# Patient Record
Sex: MALE | Race: BLACK OR AFRICAN AMERICAN | Employment: UNEMPLOYED | URBAN - METROPOLITAN AREA
[De-identification: names, ages, dates, MRNs, and addresses within clinical notes are randomized per-mention and may not be internally consistent; named-entity substitution may affect disease eponyms.]

---

## 2017-02-08 ENCOUNTER — HOSPITAL ENCOUNTER (OUTPATIENT)
Dept: LAB | Age: 72
Discharge: HOME | End: 2017-02-08
Payer: MEDICARE

## 2017-02-08 ENCOUNTER — OFFICE VISIT (OUTPATIENT)
Dept: INTERNAL MEDICINE CLINIC | Age: 72
End: 2017-02-08

## 2017-02-08 VITALS
DIASTOLIC BLOOD PRESSURE: 89 MMHG | HEART RATE: 82 BPM | OXYGEN SATURATION: 90 % | RESPIRATION RATE: 18 BRPM | HEIGHT: 68 IN | WEIGHT: 193 LBS | TEMPERATURE: 96.9 F | SYSTOLIC BLOOD PRESSURE: 125 MMHG | BODY MASS INDEX: 29.25 KG/M2

## 2017-02-08 DIAGNOSIS — G56.03 BILATERAL CARPAL TUNNEL SYNDROME: ICD-10-CM

## 2017-02-08 DIAGNOSIS — M21.969 FOOT DEFORMITY, UNSPECIFIED LATERALITY: ICD-10-CM

## 2017-02-08 DIAGNOSIS — M21.619 BUNION: ICD-10-CM

## 2017-02-08 DIAGNOSIS — E78.5 HYPERLIPIDEMIA, UNSPECIFIED HYPERLIPIDEMIA TYPE: Chronic | ICD-10-CM

## 2017-02-08 DIAGNOSIS — Z11.59 NEED FOR HEPATITIS C SCREENING TEST: ICD-10-CM

## 2017-02-08 DIAGNOSIS — E11.59 TYPE 2 DIABETES MELLITUS WITH OTHER CIRCULATORY COMPLICATIONS (HCC): ICD-10-CM

## 2017-02-08 DIAGNOSIS — Z99.81 OXYGEN DEPENDENT: Chronic | ICD-10-CM

## 2017-02-08 DIAGNOSIS — E11.9 CONTROLLED TYPE 2 DIABETES MELLITUS WITHOUT COMPLICATION, WITHOUT LONG-TERM CURRENT USE OF INSULIN (HCC): Chronic | ICD-10-CM

## 2017-02-08 DIAGNOSIS — I83.90 VARICOSE VEIN OF LEG: ICD-10-CM

## 2017-02-08 DIAGNOSIS — E11.9 CONTROLLED TYPE 2 DIABETES MELLITUS WITHOUT COMPLICATION, WITHOUT LONG-TERM CURRENT USE OF INSULIN (HCC): Primary | Chronic | ICD-10-CM

## 2017-02-08 DIAGNOSIS — J43.9 PULMONARY EMPHYSEMA, UNSPECIFIED EMPHYSEMA TYPE (HCC): Chronic | ICD-10-CM

## 2017-02-08 DIAGNOSIS — I87.2 VENOUS STASIS DERMATITIS OF BOTH LOWER EXTREMITIES: ICD-10-CM

## 2017-02-08 DIAGNOSIS — Z23 ENCOUNTER FOR IMMUNIZATION: ICD-10-CM

## 2017-02-08 LAB
ALBUMIN SERPL BCP-MCNC: 3.8 G/DL (ref 3.4–5)
ALBUMIN/GLOB SERPL: 1.1 {RATIO} (ref 0.8–1.7)
ALP SERPL-CCNC: 95 U/L (ref 45–117)
ALT SERPL-CCNC: 23 U/L (ref 16–61)
ANION GAP BLD CALC-SCNC: 8 MMOL/L (ref 3–18)
APPEARANCE UR: CLEAR
AST SERPL W P-5'-P-CCNC: 17 U/L (ref 15–37)
BILIRUB SERPL-MCNC: 0.3 MG/DL (ref 0.2–1)
BILIRUB UR QL: NEGATIVE
BUN SERPL-MCNC: 15 MG/DL (ref 7–18)
BUN/CREAT SERPL: 19 (ref 12–20)
CALCIUM SERPL-MCNC: 9.4 MG/DL (ref 8.5–10.1)
CHLORIDE SERPL-SCNC: 101 MMOL/L (ref 100–108)
CHOLEST SERPL-MCNC: 175 MG/DL
CO2 SERPL-SCNC: 34 MMOL/L (ref 21–32)
COLOR UR: YELLOW
CREAT SERPL-MCNC: 0.81 MG/DL (ref 0.6–1.3)
GLOBULIN SER CALC-MCNC: 3.4 G/DL (ref 2–4)
GLUCOSE SERPL-MCNC: 104 MG/DL (ref 74–99)
GLUCOSE UR STRIP.AUTO-MCNC: NEGATIVE MG/DL
HBA1C MFR BLD: 6.9 % (ref 4.2–5.6)
HDLC SERPL-MCNC: 60 MG/DL (ref 40–60)
HDLC SERPL: 2.9 {RATIO} (ref 0–5)
HGB UR QL STRIP: NEGATIVE
KETONES UR QL STRIP.AUTO: NEGATIVE MG/DL
LDLC SERPL CALC-MCNC: 79.8 MG/DL (ref 0–100)
LEUKOCYTE ESTERASE UR QL STRIP.AUTO: NEGATIVE
LIPID PROFILE,FLP: ABNORMAL
NITRITE UR QL STRIP.AUTO: NEGATIVE
PH UR STRIP: >8.5 [PH] (ref 5–8)
POTASSIUM SERPL-SCNC: 4.3 MMOL/L (ref 3.5–5.5)
PROT SERPL-MCNC: 7.2 G/DL (ref 6.4–8.2)
PROT UR STRIP-MCNC: NEGATIVE MG/DL
SODIUM SERPL-SCNC: 143 MMOL/L (ref 136–145)
SP GR UR REFRACTOMETRY: 1.02 (ref 1–1.03)
TRIGL SERPL-MCNC: 176 MG/DL (ref ?–150)
UROBILINOGEN UR QL STRIP.AUTO: 1 EU/DL (ref 0.2–1)
VLDLC SERPL CALC-MCNC: 35.2 MG/DL

## 2017-02-08 PROCEDURE — 80061 LIPID PANEL: CPT | Performed by: INTERNAL MEDICINE

## 2017-02-08 PROCEDURE — 83036 HEMOGLOBIN GLYCOSYLATED A1C: CPT | Performed by: INTERNAL MEDICINE

## 2017-02-08 PROCEDURE — 86803 HEPATITIS C AB TEST: CPT | Performed by: INTERNAL MEDICINE

## 2017-02-08 PROCEDURE — 81003 URINALYSIS AUTO W/O SCOPE: CPT | Performed by: INTERNAL MEDICINE

## 2017-02-08 PROCEDURE — 82043 UR ALBUMIN QUANTITATIVE: CPT | Performed by: INTERNAL MEDICINE

## 2017-02-08 PROCEDURE — 80053 COMPREHEN METABOLIC PANEL: CPT | Performed by: INTERNAL MEDICINE

## 2017-02-08 NOTE — PROGRESS NOTES
HISTORY OF PRESENT ILLNESS  Kike Cespedes is a 73 y.o. male. Visit Vitals    /89    Pulse 82    Temp 96.9 °F (36.1 °C) (Oral)    Resp 18    Ht 5' 8\" (1.727 m)    Wt 193 lb (87.5 kg)    SpO2 90%  Comment: on 1L of O2    BMI 29.35 kg/m2       New Patient   The history is provided by the patient. This is a new problem. Numbness   The history is provided by the patient. This is a new problem. The current episode started more than 1 week ago. The problem has not changed since onset. Review of Systems   Neurological: Positive for numbness.        Physical Exam    ASSESSMENT and PLAN  {ASSESSMENT/PLAN:35611}

## 2017-02-08 NOTE — MR AVS SNAPSHOT
Visit Information Date & Time Provider Department Dept. Phone Encounter #  
 2/8/2017  3:15 PM Yaw [facility] ([place]) [phone] [id number] Follow-up Instructions Return in about 4 weeks (around 3/8/2017) for HTN, DM, cholesterol, recheck skin/legs. Upcoming Health Maintenance Date Due Hepatitis C Screening 1945 DTaP/Tdap/Td series (1 - Tdap) 6/11/1968 Pneumococcal 65+ Low/Medium Risk (1 of 2 - PCV13) 6/11/2012 FOOT EXAM Q1 4/24/2016 MICROALBUMIN Q1 5/29/2016 INFLUENZA AGE 9 TO ADULT 8/1/2016 HEMOGLOBIN A1C Q6M 9/8/2016 LIPID PANEL Q1 11/18/2016 MEDICARE YEARLY EXAM 2/18/2017 EYE EXAM RETINAL OR DILATED Q1 5/2/2017 GLAUCOMA SCREENING Q2Y 5/2/2018 COLONOSCOPY 6/26/2025 Allergies as of 2/8/2017  Review Complete On: 2/8/2017 By: Yaw Schwarz MD  
  
 Severity Noted Reaction Type Reactions Ace Inhibitors  10/09/2015    Cough Current Immunizations  Reviewed on 3/16/2016 Name Date Influenza High Dose Vaccine PF  Incomplete Influenza Vaccine 10/31/2014 Not reviewed this visit You Were Diagnosed With   
  
 Codes Comments Controlled type 2 diabetes mellitus without complication, without long-term current use of insulin ([facility]    -  Primary ICD-10-CM: E11.9 ICD-9-CM: 250.00 Pulmonary emphysema, unspecified emphysema type ([facility]     ICD-10-CM: J43.9 ICD-9-CM: 492.8 Hyperlipidemia, unspecified hyperlipidemia type     ICD-10-CM: E78.5 ICD-9-CM: 272.4 Oxygen dependent     ICD-10-CM: Z99.81 ICD-9-CM: V46.2 Bilateral carpal tunnel syndrome     ICD-10-CM: G56.03 
ICD-9-CM: 354.0 Venous stasis dermatitis of both lower extremities     ICD-10-CM: I83.11, I83.12 
ICD-9-CM: 454.1 Varicose vein of leg     ICD-10-CM: I83.90 ICD-9-CM: 454.9 Type 2 diabetes mellitus with other circulatory complications (HCC)     ZKE-96-HO: E11.59 
ICD-9-CM: 250.70 Need for hepatitis C screening test     ICD-10-CM: Z11.59 
ICD-9-CM: V73.89 Foot deformity, unspecified laterality     ICD-10-CM: M21.969 ICD-9-CM: 736.70 Bunion     ICD-10-CM: J35.861 ICD-9-CM: 727.1 Encounter for immunization     ICD-10-CM: Q60 ICD-9-CM: V03.89 Vitals BP Pulse Temp Resp Height(growth percentile) Weight(growth percentile) 125/89 82 96.9 °F (36.1 °C) (Oral) 18 5' 8\" (1.727 m) 193 lb (87.5 kg) SpO2 BMI Smoking Status 90% 29.35 kg/m2 Former Smoker BMI and BSA Data Body Mass Index Body Surface Area  
 29.35 kg/m 2 2.05 m 2 Preferred Pharmacy Pharmacy Name Phone [address], [address] [phone] Your Updated Medication List  
  
   
This list is accurate as of: 2/8/17  4:34 PM.  Always use your most recent med list.  
  
  
  
  
 aspirin, buffered 81 mg Tab Take  by mouth. azithromycin 250 mg tablet Commonly known as:  Prema Stanley Take two tablets today then one tablet daily Blood-Glucose Meter monitoring kit Use to check blood sugar daily  
  
 bumetanide 1 mg tablet Commonly known as:  Myra Baar Take one tablet weekly  
  
 clotrimazole-betamethasone topical cream  
Commonly known as:  Milas Gabriele Apply to affected area BID for 2 weeks. fluticasone-salmeterol 500-50 mcg/dose diskus inhaler Commonly known as:  ADVAIR DISKUS  
TAKE 1 PUFF BY INHALATION EVERY TWELVE (12) HOURS. losartan 50 mg tablet Commonly known as:  COZAAR Take 1 Tab by mouth daily. metFORMIN 1,000 mg tablet Commonly known as:  GLUCOPHAGE Take 1 Tab by mouth two (2) times daily (with meals). metoprolol succinate 25 mg XL tablet Commonly known as:  TOPROL-XL Take 1 tablet by mouth daily. pantoprazole 40 mg tablet Commonly known as:  PROTONIX  
TAKE 1 TABLET BY MOUTH DAILY. potassium chloride SR 10 mEq tablet Commonly known as:  KLOR-CON 10  
 Take 20 mEq by mouth two (2) times a day. simvastatin 20 mg tablet Commonly known as:  ZOCOR Take 1 Tab by mouth nightly. SPIRIVA WITH HANDIHALER 18 mcg inhalation capsule Generic drug:  tiotropium USE 1 CAPSULE VIA HANDIHALER ONCE A DAY FOR INHALATION ***DO NOT SWALLOW THE CAPSULES***  
  
 tamsulosin 0.4 mg capsule Commonly known as:  FLOMAX Take 1 capsule by mouth daily. traMADol 50 mg tablet Commonly known as:  ULTRAM  
Take 1 Tab by mouth every eight (8) hours as needed for Pain. * VENTOLIN HFA 90 mcg/actuation inhaler Generic drug:  albuterol TAKE 2 PUFFS BY INHALATION EVERY FOUR (4) HOURS AS NEEDED FOR WHEEZING  . * albuterol 2.5 mg /3 mL (0.083 %) nebulizer solution Commonly known as:  PROVENTIL VENTOLIN  
3 mL by Nebulization route every four (4) hours as needed for Wheezing. Dispense 100 unit dose vials * albuterol 90 mcg/actuation inhaler Commonly known as:  PROVENTIL HFA, VENTOLIN HFA, PROAIR HFA Take 2 Puffs by inhalation every six (6) hours as needed for Wheezing. * Notice: This list has 3 medication(s) that are the same as other medications prescribed for you. Read the directions carefully, and ask your doctor or other care provider to review them with you. We Performed the Following ADMIN INFLUENZA VIRUS VAC [ [facility]] AMB SUPPLY ORDER [[id number] Custom] Comments:  
 Bilateral wrist splints AMB SUPPLY ORDER [[id number] Custom] Comments:  
 Diabetic shoes INFLUENZA VIRUS VACCINE, HIGH DOSE SEASONAL, PRESERVATIVE FREE [17521 CPT(R)] REFERRAL TO PODIATRY [REF90 Custom] Comments:  
 Please evaluate patient for diabetic foot care. Follow-up Instructions Return in about 4 weeks (around 3/8/2017) for HTN, DM, cholesterol, recheck skin/legs. To-Do List   
 Around 02/08/2017 Imaging:  ANKLE BRACHIAL INDEX   
  
 02/08/2017 Lab:  HCV AB W/RFLX TO JEREMY   
  
 02/08/2017 Lab:  HEMOGLOBIN A1C W/O EAG   
  
 02/08/2017 Lab:  LIPID PANEL   
  
 02/08/2017 Lab:  METABOLIC PANEL, COMPREHENSIVE   
  
 02/08/2017 Lab:  MICROALBUMIN, UR, RAND W/ MICROALBUMIN/CREA RATIO   
  
 02/08/2017 Lab:  URINALYSIS W/ RFLX MICROSCOPIC Referral Information Referral ID Referred By Referred To  
  
 [id number] Misa Orona [facility] Not Available Visits Status Start Date End Date 1 New Request 2/8/17 2/8/18 If your referral has a status of pending review or denied, additional information will be sent to support the outcome of this decision. Introducing [facility]! Keya Schaeffer introduces [employer] patient portal. Now you can access parts of your medical record, email your doctor's office, and request medication refills online. 1. In your internet browser, go to [email or web address] 2. Click on the First Time User? Click Here link in the Sign In box. You will see the New Member Sign Up page. 3. Enter your [employer] Access Code exactly as it appears below. You will not need to use this code after youve completed the sign-up process. If you do not sign up before the expiration date, you must request a new code. · [employer] Access Code: [email or web address] Expires: 5/9/2017  4:34 PM 
 
4. Enter the last four digits of your Social Security Number (xxxx) and Date of Birth (mm/dd/yyyy) as indicated and click Submit. You will be taken to the next sign-up page. 5. Create a Int[employer] ID. This will be your [employer] login ID and cannot be changed, so think of one that is secure and easy to remember. 6. Create a [employer] password. You can change your password at any time. 7. Enter your Password Reset Question and Answer. This can be used at a later time if you forget your password. 8. Enter your e-mail address. You will receive e-mail notification when new information is available in [address] 9. Click Sign Up. You can now view and download portions of your medical record. 10. Click the Download Summary menu link to download a portable copy of your medical information. If you have questions, please visit the Frequently Asked Questions section of the [employer] website. Remember, [employer] is NOT to be used for urgent needs. For medical emergencies, dial 911. Now available from your iPhone and Android! Please provide this summary of care documentation to your next provider. Your primary care clinician is listed as CHRISTIAN Singh. If you have any questions after today's visit, please call [phone].

## 2017-02-08 NOTE — PROGRESS NOTES
HISTORY OF PRESENT ILLNESS  Hawa Thayer is a 73 y.o. male. Visit Vitals    /89    Pulse 82    Temp 96.9 °F (36.1 °C) (Oral)    Resp 18    Ht 5' 8\" (1.727 m)    Wt 193 lb (87.5 kg)    SpO2 90%  Comment: on 1L of O2    BMI 29.35 kg/m2       HPI Comments: Pt here with multiple issues. Not happy with current MD.   Has multiple issues inc COPD, oxygen dependent, diabetes mellitus, foot deformities, hand numbness    New Patient   The history is provided by the patient (see comments). This is a new problem. Numbness   The history is provided by the patient (both hands, thumb to 4th digits). This is a new problem. The current episode started more than 1 week ago. The problem has not changed since onset. Primary symptoms include focal weakness. Diabetes   The history is provided by the patient. This is a chronic problem. The current episode started more than 1 week ago. The problem occurs daily. The problem has not changed since onset. Exacerbated by: diet. The symptoms are relieved by medications (diet). Treatments tried: see med list. The treatment provided moderate relief. Review of Systems   Neurological: Positive for focal weakness and numbness. Physical Exam   Constitutional: He is oriented to person, place, and time. He appears well-developed and well-nourished. No distress. HENT:   Right Ear: Tympanic membrane, external ear and ear canal normal.   Left Ear: Tympanic membrane, external ear and ear canal normal.   Mouth/Throat: Uvula is midline, oropharynx is clear and moist and mucous membranes are normal.   edentulous   Eyes: Conjunctivae and EOM are normal.   Cardiovascular: Normal rate and regular rhythm. Pulmonary/Chest: Effort normal and breath sounds normal.   Musculoskeletal: He exhibits no edema. Neurological: He is alert and oriented to person, place, and time.    Diabetic foot exam:     Left: Reflexes 2+     Vibratory sensation diminished    Proprioception normal   Sharp/dull discrimination     Filament test normal sensation with micro filament   Pulse DP: 2+ (normal)   Pulse PT:         + tinel's at left wrist. ? Suggestion of thenar wasting. Good  bilat. Deformities: Yes - severe bunion deformity with overlapping toes    Right: Reflexes 2+   Vibratory sensation diminished   Proprioception normal   Sharp/dull discrimination    Filament test normal sensation with micro filament   Pulse DP: 2+ (normal)   Pulse PT:    Deformities: Yes - severe bunion deformity with overlapping toes     Skin: Skin is warm and dry. He is not diaphoretic. Has dry cracked patch on left posterior ankle with some stasis derm changes   Psychiatric: He has a normal mood and affect. Nursing note and vitals reviewed. ASSESSMENT and PLAN    ICD-10-CM ICD-9-CM    1. Controlled type 2 diabetes mellitus without complication, without long-term current use of insulin (HCC) X43.9 008.44 METABOLIC PANEL, COMPREHENSIVE      LIPID PANEL      HEMOGLOBIN A1C W/O EAG      MICROALBUMIN, UR, RAND W/ MICROALBUMIN/CREA RATIO      URINALYSIS W/ RFLX MICROSCOPIC      AMB SUPPLY ORDER      REFERRAL TO PODIATRY       DIABETES FOOT EXAM   2. Pulmonary emphysema, unspecified emphysema type ([facility] J43.9 492.8    3. Hyperlipidemia, unspecified hyperlipidemia type E78.5 272.4 LIPID PANEL   4. Oxygen dependent Z99.81 V46.2    5. Bilateral carpal tunnel syndrome G56.03 354.0 AMB SUPPLY ORDER   6. Venous stasis dermatitis of both lower extremities I83.11 454. 1 ANKLE BRACHIAL INDEX    I83.12     7. Varicose vein of leg I83.90 454.9 ANKLE BRACHIAL INDEX   8. Type 2 diabetes mellitus with other circulatory complications (HCC)  H72.30 250.70 ANKLE BRACHIAL INDEX      AMB SUPPLY ORDER      REFERRAL TO PODIATRY   9. Need for hepatitis C screening test Z11.59 V73.89 HCV AB W/RFLX TO JEREMY   10.  Foot deformity, unspecified laterality M21.969 736.70 AMB SUPPLY ORDER      REFERRAL TO PODIATRY   11. Bunion M21.619 727.1 AMB SUPPLY ORDER      REFERRAL TO PODIATRY   12. Encounter for immunization Z23 V03.89 INFLUENZA VIRUS VACCINE, HIGH DOSE SEASONAL, PRESERVATIVE FREE      ADMIN INFLUENZA VIRUS VAC     Available hospital record reviewed  Past medical history, surgical history, family history, and social history reviewed with patient  Very complex patient    Update lab    Other orders as noted  Wrist splints--discussed long term tx and evaluation for bilat CTS. Diabetic shoes requested given severe foot deformities. Refer to podiatry for diabetic foot care    Check LIMA given the skin changes on his ankles.  Recommended vaseline for those areas    F/u one month

## 2017-02-08 NOTE — PROGRESS NOTES
ROOM # 4    Pt presents today for New patient. Complains of numbness in his left hand, and a rash on his left ankle    Pt preferred language for health care discussion is english. Is someone accompanying this pt? no    Is the patient using any DME equipment during [address]? wheeled seated walker    Depression Screening completed. Yes    Learning Assessment completed. Yes    Abuse Screening completed. Yes    Health Maintenance reviewed and discussed per provider. Yes, first visit, all HM discussed with Provider    Advance Directive:  1. Do you have an advance directive in place? Patient Reply: No    2. If not, would you like material regarding how to put one in place? Patient Reply: No    Coordination of Care:  1. Have you been to the ER, urgent care clinic since your last visit? Hospitalized since your last visit? No    2. Have you seen or consulted any other health care providers outside of the [facility] since your last visit? Include any pap smears or colon screening.  No

## 2017-02-09 LAB
CREAT UR-MCNC: 109.04 MG/DL (ref 30–125)
HCV AB S/CO SERPL IA: <0.1 S/CO RATIO (ref 0–0.9)
HCV AB SERPL QL IA: NORMAL
MICROALBUMIN UR-MCNC: 3.1 MG/DL (ref 0–3)
MICROALBUMIN/CREAT UR-RTO: 28 MG/G (ref 0–30)

## 2017-03-13 ENCOUNTER — TELEPHONE (OUTPATIENT)
Dept: INTERNAL MEDICINE CLINIC | Age: 72
End: 2017-03-13

## 2017-03-13 NOTE — TELEPHONE ENCOUNTER
Venice Senior called to state [place] [facility] [place] but 2 changes necessary. Date of Service should be 2/13/17. Refer to facility [phone].

## 2017-03-14 ENCOUNTER — HOSPITAL ENCOUNTER (OUTPATIENT)
Dept: VASCULAR SURGERY | Age: 72
Discharge: HOME | End: 2017-03-14
Attending: INTERNAL MEDICINE
Payer: MEDICARE

## 2017-03-14 DIAGNOSIS — I83.90 VARICOSE VEIN OF LEG: ICD-10-CM

## 2017-03-14 DIAGNOSIS — E11.59 TYPE 2 DIABETES MELLITUS WITH OTHER CIRCULATORY COMPLICATIONS (HCC): ICD-10-CM

## 2017-03-14 DIAGNOSIS — I87.2 VENOUS STASIS DERMATITIS OF BOTH LOWER EXTREMITIES: ICD-10-CM

## 2017-03-14 PROCEDURE — 93922 UPR/L XTREMITY ART 2 LEVELS: CPT

## 2017-03-14 NOTE — PROCEDURES
[facility]  *** FINAL REPORT ***    Name: Mukul Ayers  MRN: [id number]    Outpatient  : 1945  HIS Order #: [id number]  [id number] [facility] Visit #: [id number]  Date: 14 Mar 2017    TYPE OF TEST: Peripheral Arterial Testing    REASON FOR TEST  Stasis dermatitis    Right Leg  Doppler:    Normal  Ankle/Brachial: 1.05    Left Leg  Doppler:    Normal  Ankle/Brachial: 1.02    INTERPRETATION/FINDINGS  Physiologic testing was performed using continuous wave Doppler and  segmental pressures. 1. No evidence of significant peripheral arterial disease at rest in  the right leg. 2. No evidence of significant peripheral arterial disease at rest in  the left leg. 3. The right ankle/brachial index is 1.05 and the left ankle/brachial  index is 1.02.  4. Normal digital brachial index of 0.60 or greater bilaterally with  no signficiant digital disease identified. ADDITIONAL COMMENTS    I have personally reviewed the data relevant to the interpretation of  this  study. TECHNOLOGIST: Valdo Lyons. Carey Kent  Signed: 2017 12:53 PM    PHYSICIAN: Derek Pop.  John Garcia MD  Signed: 2017 09:54 PM

## 2017-03-21 DIAGNOSIS — E11.9 CONTROLLED TYPE 2 DIABETES MELLITUS WITHOUT COMPLICATION, WITHOUT LONG-TERM CURRENT USE OF INSULIN (HCC): Chronic | ICD-10-CM

## 2017-03-21 RX ORDER — ISOPROPYL ALCOHOL 70 ML/100ML
SWAB TOPICAL
Qty: 100 PAD | Refills: 5 | Status: SHIPPED | OUTPATIENT
Start: 2017-03-21 | End: 2018-05-03 | Stop reason: SDUPTHER

## 2017-03-21 RX ORDER — LANCETS
EACH MISCELLANEOUS
Qty: 100 EACH | Refills: 5 | Status: SHIPPED | OUTPATIENT
Start: 2017-03-21 | End: 2018-05-05 | Stop reason: SDUPTHER

## 2017-03-21 RX ORDER — BLOOD-GLUCOSE METER
EACH MISCELLANEOUS
Qty: 1 EACH | Status: SHIPPED | OUTPATIENT
Start: 2017-03-21 | End: 2018-05-05 | Stop reason: SDUPTHER

## 2017-03-21 NOTE — TELEPHONE ENCOUNTER
Requested Prescriptions     Pending Prescriptions Disp Refills    alcohol swabs (BD SINGLE USE SWABS REGULAR) padm 100 Pad 5     Sig: Use as directed to test blood sugar    Blood Glucose Control High&Low (ACCU-CHEK BRIAN CONTROL SOLN) soln 1 Bottle 5     Sig: use to test meter with each new box of strips and as needed    Blood-Glucose Meter (ACCU-CHEK BRIAN PLUS METER) misc 1 Each prn     Sig: Use to test blood sugar daily or as directed    Lancets (ACCU-CHEK SOFTCLIX LANCETS) misc 100 Each 5     Sig: Use to test blood sugar daily    glucose blood VI test strips (ACCU-CHEK BRIAN PLUS TEST STRP) strip 100 Strip 5     Sig: Use to test blood sugar daily

## 2017-03-21 NOTE — TELEPHONE ENCOUNTER
Requested Prescriptions     Pending Prescriptions Disp Refills    alcohol swabs (BD SINGLE USE SWABS REGULAR) padm 100 Pad 5     Sig: Use as directed to test blood sugar    Blood Glucose Control High&Low (ACCU-CHEK BRIAN CONTROL SOLN) soln 1 Bottle 5     Sig: use to test meter with each new box of strips and as needed

## 2017-03-22 NOTE — TELEPHONE ENCOUNTER
Venice Senior called to state received [facility] referral but 2 changes necessary.     Date of Service should be 2/13/17.     Refer to facility [phone].         Updated info on [place] [facility] Payal Weir ortho fax confirmation received as ok

## 2017-03-23 ENCOUNTER — TELEPHONE (OUTPATIENT)
Dept: INTERNAL MEDICINE CLINIC | Age: 72
End: 2017-03-23

## 2017-03-23 NOTE — TELEPHONE ENCOUNTER
Spoke with patient, confirmed name and . Patient just wanted to make our office aware that he had his LIMA performed and was told that he may need an antibiotic, but other than that he looked good. Stated that he has a cold, and will probably come in Saturday to be checked out and have it taken care of.     Be advised

## 2017-03-23 NOTE — TELEPHONE ENCOUNTER
Patient would like to speak to Dr. Chris Arreola nurse in reference to his recent hospital stay.   Can be reached at [phone]

## 2017-03-27 NOTE — TELEPHONE ENCOUNTER
Jeanine Carter from [facility] said she have been trying to get a [employer] referral completed for over a month now and nothing have been done.  She would like Shelley Bonds to call her at [phone]

## 2017-03-28 ENCOUNTER — TELEPHONE (OUTPATIENT)
Dept: INTERNAL MEDICINE CLINIC | Age: 72
End: 2017-03-28

## 2017-03-28 NOTE — TELEPHONE ENCOUNTER
Spoke with [employer], confirmed pt name and . Discussed DME referral request back date.  Approval # [id number]    Encounter to be closed

## 2017-03-28 NOTE — TELEPHONE ENCOUNTER
Spoke with Edgar Fiore confirmed pt name and . After speaking with LEVI Hernández, I advised that if she would be so kind to fax the updated referral back to [facility], that all issues should be taken care of. Fam verbalized understanding.

## 2017-05-03 ENCOUNTER — OFFICE VISIT (OUTPATIENT)
Dept: INTERNAL MEDICINE CLINIC | Age: 72
End: 2017-05-03

## 2017-05-03 VITALS
BODY MASS INDEX: 29.25 KG/M2 | WEIGHT: 193 LBS | TEMPERATURE: 97.6 F | HEART RATE: 105 BPM | SYSTOLIC BLOOD PRESSURE: 129 MMHG | OXYGEN SATURATION: 92 % | RESPIRATION RATE: 20 BRPM | DIASTOLIC BLOOD PRESSURE: 69 MMHG | HEIGHT: 68 IN

## 2017-05-03 DIAGNOSIS — Z23 ENCOUNTER FOR IMMUNIZATION: ICD-10-CM

## 2017-05-03 DIAGNOSIS — E11.9 CONTROLLED TYPE 2 DIABETES MELLITUS WITHOUT COMPLICATION, WITHOUT LONG-TERM CURRENT USE OF INSULIN (HCC): Chronic | ICD-10-CM

## 2017-05-03 DIAGNOSIS — L30.9 DERMATITIS: ICD-10-CM

## 2017-05-03 DIAGNOSIS — Z00.00 ROUTINE GENERAL MEDICAL EXAMINATION AT A HEALTH CARE FACILITY: Primary | ICD-10-CM

## 2017-05-03 DIAGNOSIS — E78.5 HYPERLIPIDEMIA, UNSPECIFIED HYPERLIPIDEMIA TYPE: Chronic | ICD-10-CM

## 2017-05-03 DIAGNOSIS — Z13.39 SCREENING FOR ALCOHOLISM: ICD-10-CM

## 2017-05-03 DIAGNOSIS — Z99.81 OXYGEN DEPENDENT: Chronic | ICD-10-CM

## 2017-05-03 DIAGNOSIS — R53.83 FATIGUE, UNSPECIFIED TYPE: ICD-10-CM

## 2017-05-03 RX ORDER — CLOTRIMAZOLE AND BETAMETHASONE DIPROPIONATE 10; .64 MG/G; MG/G
CREAM TOPICAL
Qty: 30 G | Refills: 11 | Status: SHIPPED | OUTPATIENT
Start: 2017-05-03 | End: 2017-05-04 | Stop reason: SDUPTHER

## 2017-05-03 NOTE — PATIENT INSTRUCTIONS
Schedule of Personalized Health Plan  (Provide Copy to Patient)  The best way to stay healthy is to live a healthy lifestyle. A healthy lifestyle includes regular exercise, eating a well-balanced diet, keeping a healthy weight and not smoking. Regular physical exams and screening tests are another important way to take care of yourself. Preventive exams provided by health care providers can find health problems early when treatment works best and can keep you from getting certain diseases or illnesses. Preventive services include exams, lab tests, screenings, shots, monitoring and information to help you take care of your own health. All people over 65 should have a pneumonia shot. Pneumonia shots are usually only needed once in a lifetime unless your doctor decides differently. All people over 65 should have a yearly flu shot. People over 65 are at medium to high risk for Hepatitis B. Three shots are needed for complete protection. In addition to your physical exam, some screening tests are recommended:    Bone mass measurement (dexa scan) is recommended every two years if you have certain risk factors, such as personal history of vertebral fracture or chronic steroid medication use    Diabetes Mellitus screening is recommended every year. Glaucoma is an eye disease caused by high pressure in the eye. An eye exam is recommended every year. Cardiovascular screening tests that check your cholesterol and other blood fat (lipid) levels are recommended every five years. Colorectal Cancer screening tests help to find pre-cancerous polyps (growths in the colon) so they can be removed before they turn into cancer. Tests ordered for screening depend on your personal and family history risk factors.     Screening for Prostate Cancer is recommended yearly with a digital rectal exam and/or a PSA test    Here is a list of your current Health Maintenance items with a due date:  Health Maintenance Topic Date Due    Pneumococcal 65+ Low/Medium Risk (1 of 2 - PCV13) 06/11/2012    MEDICARE YEARLY EXAM  02/18/2017    EYE EXAM RETINAL OR DILATED Q1  05/02/2017    DTaP/Tdap/Td series (1 - Tdap) 04/18/2018 (Originally 6/11/1966)    INFLUENZA AGE 9 TO ADULT  08/01/2017    HEMOGLOBIN A1C Q6M  08/08/2017    FOOT EXAM Q1  02/08/2018    MICROALBUMIN Q1  02/08/2018    LIPID PANEL Q1  02/08/2018    GLAUCOMA SCREENING Q2Y  05/02/2018    COLONOSCOPY  06/26/2025    Hepatitis C Screening  Completed    ZOSTER VACCINE AGE 60>  Completed

## 2017-05-03 NOTE — PROGRESS NOTES
HISTORY OF PRESENT ILLNESS  Christiano Ochoa is a 73 y.o. male. Visit Vitals    /69    Pulse (!) 105    Temp 97.6 °F (36.4 °C) (Oral)    Resp 20    Ht 5' 8\" (1.727 m)    Wt 193 lb (87.5 kg)    SpO2 92%  Comment: on 2L of O2    BMI 29.35 kg/m2       Cholesterol Problem   The history is provided by the patient. This is a chronic problem. The current episode started more than 1 week ago. The problem occurs daily. The problem has not changed since onset. Pertinent negatives include no chest pain, no headaches and no shortness of breath. The symptoms are relieved by medications. Diabetes   The history is provided by the patient. This is a chronic problem. The current episode started more than 1 week ago. The problem occurs daily. The problem has not changed since onset. Pertinent negatives include no chest pain, no headaches and no shortness of breath. Exacerbated by: diet. The symptoms are relieved by medications (diet). Treatments tried: see med list.   Hypertension    The history is provided by the patient. This is a chronic problem. The current episode started more than 1 week ago. The problem has not changed since onset. Pertinent negatives include no chest pain, no palpitations, no headaches and no shortness of breath. There are no associated agents to hypertension. Risk factors include male gender, hypertension, stress and diabetes mellitus. Review of Systems   Constitutional: Negative. Negative for chills and fever. Respiratory: Negative for cough and shortness of breath. Nothing above the usual. Pollen and weather changes. Cardiovascular: Negative for chest pain, palpitations and leg swelling. Neurological: Negative for headaches. Physical Exam   Constitutional: He is oriented to person, place, and time. He appears well-developed and well-nourished. No distress. Cardiovascular: Normal rate and regular rhythm.     Pulmonary/Chest: Effort normal and breath sounds normal. Musculoskeletal: He exhibits no edema. Left wrist + Tinel's sign. Good hand  and no muscle wasting   Neurological: He is alert and oriented to person, place, and time. Skin: Skin is warm and dry. He is not diaphoretic. Psychiatric: He has a normal mood and affect. Nursing note and vitals reviewed. ASSESSMENT and PLAN    ICD-10-CM ICD-9-CM                   Controlled type 2 diabetes mellitus without complication, without long-term current use of insulin ([facility]) E11.9 250.00 REFERRAL TO OPHTHALMOLOGY    Hyperlipidemia, unspecified hyperlipidemia type E78.5 272.4     Oxygen dependent Z99.81 V46.2     Dermatitis L30.9 692.9 clotrimazole-betamethasone (LOTRISONE) topical cream      VITAMIN B12 & FOLATE                 Fatigue, unspecified type R53.83 780.79 CBC WITH AUTOMATED DIFF      VITAMIN B12 & FOLATE      TSH AND FREE T4       BP controlled    Last Blood sugar much better. Too soon yet for HBA1c    last cholesterol was very good--will skip today    Will contact [place] re his portable oxygen generator--it keeps cutting off on him. Given his fatigue--will check his CBC/iron, B12/folate, and thyrid    Discussed weight, lifestyle, diet and exercise.  Pt is watching his diet and has lost a little weight    F/u here 2-3 months

## 2017-05-03 NOTE — PROGRESS NOTES
Chief Complaint   Patient presents with    Cholesterol Problem    Diabetes    Hypertension       Pt preferred language for health care discussion is english. Is someone accompanying this pt? no    Is the patient using any DME equipment during [address]? Rollator    Depression Screening completed. Yes    Learning Assessment completed. Yes    Abuse Screening completed. Yes    Health Maintenance reviewed and discussed per provider. Yes    Pt is due for Eye exam, Pneumo-13 or Peumo-23. Please order/place referral if appropriate. Advance Directive:  1. Do you have an advance directive in place? Patient Reply:no    2. If not, would you like material regarding how to put one in place? Patient Reply: No    Coordination of Care:  1. Have you been to the ER, urgent care clinic since your last visit? Hospitalized since your last visit? no    2. Have you seen or consulted any other health care providers outside of the [address] [place] [address] since your last visit? Include any pap smears or colon screening.  no

## 2017-05-03 NOTE — MR AVS SNAPSHOT
Visit Information Date & Time Provider Department Dept. Phone Encounter #  
 5/3/2017  5:30 PM [address] [phone] [id number] Follow-up Instructions Return in about 2 months (around 7/3/2017) for HTN, DM, cholesterol, COPD. Upcoming Health Maintenance Date Due Pneumococcal 65+ Low/Medium Risk (1 of 2 - PCV13) 6/11/2012 MEDICARE YEARLY EXAM 2/18/2017 EYE EXAM RETINAL OR DILATED Q1 5/2/2017 DTaP/Tdap/Td series (1 - Tdap) 4/18/2018* INFLUENZA AGE 9 TO ADULT 8/1/2017 HEMOGLOBIN A1C Q6M 8/8/2017 FOOT EXAM Q1 2/8/2018 MICROALBUMIN Q1 2/8/2018 LIPID PANEL Q1 2/8/2018 GLAUCOMA SCREENING Q2Y 5/2/2018 COLONOSCOPY 6/26/2025 *Topic was postponed. The date shown is not the original due date. Allergies as of 5/3/2017  Review Complete On: 5/3/2017 By: Laura Rudd MD  
  
 Severity Noted Reaction Type Reactions Ace Inhibitors  10/09/2015    Cough Current Immunizations  Reviewed on 5/3/2017 Name Date Influenza High Dose Vaccine PF 2/8/2017  5:06 PM  
 Influenza Vaccine 10/31/2014 Pneumococcal Conjugate (PCV-13) 12/17/2015 Pneumococcal Polysaccharide (PPSV-23)  Incomplete, 10/21/2011 Reviewed by Laura Rudd MD on 5/3/2017 at  5:36 PM  
You Were Diagnosed With   
  
 Codes Comments Routine general medical examination at a health care facility    -  Primary ICD-10-CM: Z00.00 ICD-9-CM: V70.0 Screening for alcoholism     ICD-10-CM: Z13.89 ICD-9-CM: V79.1 Controlled type 2 diabetes mellitus without complication, without long-term current use of insulin [facility]     ICD-10-CM: E11.9 ICD-9-CM: 250.00 Hyperlipidemia, unspecified hyperlipidemia type     ICD-10-CM: E78.5 ICD-9-CM: 272.4 Oxygen dependent     ICD-10-CM: Z99.81 ICD-9-CM: V46.2 Dermatitis     ICD-10-CM: L30.9 ICD-9-CM: 692.9  Encounter for immunization     ICD-10-CM: D84 
 ICD-9-CM: V03.89 Fatigue, unspecified type     ICD-10-CM: R53.83 ICD-9-CM: 780.79 Vitals BP Pulse Temp Resp Height(growth percentile) Weight(growth percentile) 129/69 (!) 105 97.6 °F (36.4 °C) (Oral) 20 5' 8\" (1.727 m) 193 lb (87.5 kg) SpO2 BMI Smoking Status 92% 29.35 kg/m2 Former Smoker BMI and BSA Data Body Mass Index Body Surface Area  
 29.35 kg/m 2 2.05 m 2 Preferred Pharmacy Pharmacy Name Phone [address] [phone] Your Updated Medication List  
  
   
This list is accurate as of: 5/3/17  5:53 PM.  Always use your most recent med list.  
  
  
  
  
 alcohol swabs Padm Commonly known as:  BD Single Use Swabs Regular Use as directed to test blood sugar  
  
 aspirin, buffered 81 mg Tab Take  by mouth. Blood Glucose Control High&Low Soln Commonly known as:  ACCU-CHEK BRIAN CONTROL SOLN  
use to test meter with each new box of strips and as needed Blood-Glucose Meter Misc Commonly known as:  ACCU-CHEK BRIAN PLUS METER Use to test blood sugar daily or as directed  
  
 bumetanide 1 mg tablet Commonly known as:  Laurie Pierre Take one tablet weekly  
  
 clotrimazole-betamethasone topical cream  
Commonly known as:  Judye Silver Star Apply to affected area BID for 2 weeks. fluticasone-salmeterol 500-50 mcg/dose diskus inhaler Commonly known as:  ADVAIR DISKUS  
TAKE 1 PUFF BY INHALATION EVERY TWELVE (12) HOURS.  
  
 glucose blood VI test strips strip Commonly known as:  ACCU-CHEK BRIAN PLUS TEST STRP Use to test blood sugar daily Lancets Misc Commonly known as:  ACCU-CHEK SOFTCLIX LANCETS Use to test blood sugar daily  
  
 losartan 50 mg tablet Commonly known as:  COZAAR Take 1 Tab by mouth daily. metFORMIN 1,000 mg tablet Commonly known as:  GLUCOPHAGE Take 1 Tab by mouth two (2) times daily (with meals). metoprolol succinate 25 mg XL tablet Commonly known as:  TOPROL-XL Take 1 tablet by mouth daily. pantoprazole 40 mg tablet Commonly known as:  PROTONIX  
TAKE 1 TABLET BY MOUTH DAILY. potassium chloride SR 10 mEq tablet Commonly known as:  KLOR-CON 10 Take 20 mEq by mouth two (2) times a day. simvastatin 20 mg tablet Commonly known as:  ZOCOR Take 1 Tab by mouth nightly. tiotropium 18 mcg inhalation capsule Commonly known as:  SPIRIVA WITH HANDIHALER  
USE 1 CAPSULE VIA HANDIHALER ONCE A DAY FOR INHALATION  
  
 * VENTOLIN HFA 90 mcg/actuation inhaler Generic drug:  albuterol TAKE 2 PUFFS BY INHALATION EVERY FOUR (4) HOURS AS NEEDED FOR WHEEZING  . * albuterol 2.5 mg /3 mL (0.083 %) nebulizer solution Commonly known as:  PROVENTIL VENTOLIN  
3 mL by Nebulization route every four (4) hours as needed for Wheezing. Dispense 100 unit dose vials * albuterol 90 mcg/actuation inhaler Commonly known as:  PROVENTIL HFA, VENTOLIN HFA, PROAIR HFA Take 2 Puffs by inhalation every six (6) hours as needed for Wheezing. * Notice: This list has 3 medication(s) that are the same as other medications prescribed for you. Read the directions carefully, and ask your doctor or other care provider to review them with you. Prescriptions Sent to Pharmacy Refills  
 clotrimazole-betamethasone (LOTRISONE) topical cream 11 Sig: Apply to affected area BID for 2 weeks. Class: Normal  
 Pharmacy: [address], [facility] [address] Ph #: [phone]  
 tiotropium (SPIRIVA WITH HANDIHALER) 18 mcg inhalation capsule 5 Sig: USE 1 CAPSULE VIA HANDIHALER ONCE A DAY FOR INHALATION Class: Normal  
 Pharm[facility] [address] Ph #: [phone] We Performed the Following ADMIN PNEUMOCOCCAL VACCINE [ [facility]] PNEUMOCOCCAL POLYSACCHARIDE VACCINE, 23-VALENT, ADULT OR IMMUNOSUPPRESSED PT DOSE, [32670 CPT(R)] REFERRAL TO OPHTHALMOLOGY [REF57 Custom] Comments:  
 Please evaluate patient for diabetic eye exam.  
  
Follow-up Instructions Return in about 2 months (around 7/3/2017) for HTN, DM, cholesterol, COPD. To-Do List   
 05/03/2017 Lab:  CBC WITH AUTOMATED DIFF   
  
 05/03/2017 Lab:  TSH AND FREE T4   
  
 05/03/2017 Lab:  VITAMIN B12 & FOLATE Referral Information Referral ID Referred By Referred To  
  
 [id number] Doc [facility] Not Available Visits Status Start Date End Date 1 New Request 5/3/17 5/3/18 If your referral has a status of pending review or denied, additional information will be sent to support the outcome of this decision. Patient Instructions Schedule of Personalized Health Plan (Provide Copy to Patient) The best way to stay healthy is to live a healthy lifestyle. A healthy lifestyle includes regular exercise, eating a well-balanced diet, keeping a healthy weight and not smoking. Regular physical exams and screening tests are another important way to take care of yourself. Preventive exams provided by health care providers can find health problems early when treatment works best and can keep you from getting certain diseases or illnesses. Preventive services include exams, lab tests, screenings, shots, monitoring and information to help you take care of your own health. All people over 65 should have a pneumonia shot. Pneumonia shots are usually only needed once in a lifetime unless your doctor decides differently. All people over 65 should have a yearly flu shot. People over 65 are at medium to high risk for Hepatitis B. Three shots are needed for complete protection.  
In addition to your physical exam, some screening tests are recommended: 
 
Bone mass measurement (dexa scan) is recommended every two years if you have certain risk factors, such as personal history of vertebral fracture or chronic steroid medication use Diabetes Mellitus screening is recommended every year. Glaucoma is an eye disease caused by high pressure in the eye. An eye exam is recommended every year. Cardiovascular screening tests that check your cholesterol and other blood fat (lipid) levels are recommended every five years. Colorectal Cancer screening tests help to find pre-cancerous polyps (growths in the colon) so they can be removed before they turn into cancer. Tests ordered for screening depend on your personal and family history risk factors. Screening for Prostate Cancer is recommended yearly with a digital rectal exam and/or a PSA test 
 
Here is a list of your current Health Maintenance items with a due date: 
Health Maintenance Topic Date Due  Pneumococcal 65+ Low/Medium Risk (1 of 2 - PCV13) 06/11/2012  MEDICARE YEARLY EXAM  02/18/2017  
 EYE EXAM RETINAL OR DILATED Q1  05/02/2017  
 DTaP/Tdap/Td series (1 - Tdap) 04/18/2018 (Originally 6/11/1966)  INFLUENZA AGE 9 TO ADULT  08/01/2017  
 HEMOGLOBIN A1C Q6M  08/08/2017  
 FOOT EXAM Q1  02/08/2018  MICROALBUMIN Q1  02/08/2018  LIPID PANEL Q1  02/08/2018  GLAUCOMA SCREENING Q2Y  05/02/2018  COLONOSCOPY  06/26/2025  Hepatitis C Screening  Completed  ZOSTER VACCINE AGE 60>  Completed Introducing [facility]! Jerardo Saavedra introduces [employer] patient portal. Now you can access parts of your medical record, email your doctor's office, and request medication refills online. 1. In your internet browser, go to [email or web address] 2. Click on the First Time User? Click Here link in the Sign In box. You will see the New Member Sign Up page. 3. Enter your [employer] Access Code exactly as it appears below. You will not need to use this code after youve completed the sign-up process.  If you do not sign up before the expiration date, you must request a new code. · [employer] Access Code: [email or web address] Expires: 5/9/2017  5:34 PM 
 
4. Enter the last four digits of your Social Security Number (xxxx) and Date of Birth (mm/dd/yyyy) as indicated and click Submit. You will be taken to the next sign-up page. 5. Create a [employer] ID. This will be your [employer] login ID and cannot be changed, so think of one that is secure and easy to remember. 6. Create a [employer] password. You can change your password at any time. 7. Enter your Password Reset Question and Answer. This can be used at a later time if you forget your password. 8. Enter your e-mail address. You will receive e-mail notification when new information is available in [address] 9. Click Sign Up. You can now view and download portions of your medical record. 10. Click the Download Summary menu link to download a portable copy of your medical information. If you have questions, please visit the Frequently Asked Questions section of the [employer] website. Remember, [employer] is NOT to be used for urgent needs. For medical emergencies, dial 911. Now available from your iPhone and Android! Please provide this summary of care documentation to your next provider. Your primary care clinician is listed as [facility]. If you have any questions after today's visit, please call [phone].

## 2017-05-03 NOTE — PROGRESS NOTES
This is a Subsequent Medicare Annual Wellness Visit providing Personalized Prevention Plan Services (PPPS) (Performed 12 months after initial AWV and PPPS )    I have reviewed the patient's medical history in detail and updated the computerized patient record. History     Past Medical History:   Diagnosis Date    Bunion of great toe of left foot     Bunion of great toe of right foot     CAD (coronary artery disease) 8/5/2014    Chronic obstructive pulmonary disease ([address] 2006    Diabetes ([facility] 2006    GERD (gastroesophageal reflux disease) 8/5/2014    Hammertoe     Hyperlipidemia 8/5/2014    Hypertension     Pulmonary nodules 03/24/2017    sees Dr. Fran Mistry Stasis dermatitis       Past Surgical History:   Procedure Laterality Date    HX CORONARY ARTERY BYPASS GRAFT  8/6/2006    4 way bipass     Current Outpatient Prescriptions   Medication Sig Dispense Refill    clotrimazole-betamethasone (LOTRISONE) topical cream Apply to affected area BID for 2 weeks. 30 g 11    alcohol swabs (BD SINGLE USE SWABS REGULAR) padm Use as directed to test blood sugar 100 Pad 5    Blood Glucose Control High&Low (ACCU-CHEK BRIAN CONTROL SOLN) soln use to test meter with each new box of strips and as needed 1 Bottle 5    Blood-Glucose Meter (ACCU-CHEK BRIAN PLUS METER) misc Use to test blood sugar daily or as directed 1 Each prn    Lancets (ACCU-CHEK SOFTCLIX LANCETS) misc Use to test blood sugar daily 100 Each 5    glucose blood VI test strips (ACCU-CHEK BRIAN PLUS TEST STRP) strip Use to test blood sugar daily 100 Strip 5    metFORMIN (GLUCOPHAGE) 1,000 mg tablet Take 1 Tab by mouth two (2) times daily (with meals). 180 Tab 3    fluticasone-salmeterol (ADVAIR DISKUS) 500-50 mcg/dose diskus inhaler TAKE 1 PUFF BY INHALATION EVERY TWELVE (12) HOURS. 3 Inhaler 10    albuterol (PROVENTIL HFA, VENTOLIN HFA, PROAIR HFA) 90 mcg/actuation inhaler Take 2 Puffs by inhalation every six (6) hours as needed for Wheezing. 3 Inhaler 3    albuterol (PROVENTIL VENTOLIN) 2.5 mg /3 mL (0.083 %) nebulizer solution 3 mL by Nebulization route every four (4) hours as needed for Wheezing. Dispense 100 unit dose vials 100 Each 3    bumetanide (BUMEX) 1 mg tablet Take one tablet weekly 12 Tab 3    VENTOLIN HFA 90 mcg/actuation inhaler TAKE 2 PUFFS BY INHALATION EVERY FOUR (4) HOURS AS NEEDED FOR WHEEZING  . 1 Inhaler 11    losartan (COZAAR) 50 mg tablet Take 1 Tab by mouth daily. 90 Tab 3    pantoprazole (PROTONIX) 40 mg tablet TAKE 1 TABLET BY MOUTH DAILY. 90 Tab 2    simvastatin (ZOCOR) 20 mg tablet Take 1 Tab by mouth nightly. 90 Tab 3    metoprolol succinate (TOPROL-XL) 25 mg XL tablet Take 1 tablet by mouth daily. 90 tablet 3    Aspirin, Buffered 81 mg tab Take  by mouth.  potassium chloride SR (KLOR-CON 10) 10 mEq tablet Take 20 mEq by mouth two (2) times a day.  SPIRIVA WITH HANDIHALER 18 mcg inhalation capsule USE 1 CAPSULE VIA HANDIHALER ONCE A DAY FOR INHALATION DO NOT SWALLOW THE CAPSULES 30 Cap 4    traMADol (ULTRAM) 50 mg tablet Take 1 Tab by mouth every eight (8) hours as needed for Pain. 15 Tab 0    tamsulosin (FLOMAX) 0.4 mg capsule Take 1 capsule by mouth daily.  90 capsule 3     Allergies   Allergen Reactions    Ace Inhibitors Cough     Family History   Problem Relation Age of Onset    Heart Disease Father     Cancer Father     Diabetes Brother     Diabetes Maternal Grandmother     No Known Problems Mother     Hypertension Paternal Grandmother      Social History   Substance Use Topics    Smoking status: Former Smoker     Years: 56.00     Types: Cigarettes     Quit date: 8/6/2006    Smokeless tobacco: Never Used    Alcohol use No      Comment: rare     Patient Active Problem List   Diagnosis Code    [facility]) [facility] J44.9    GERD (gastroesophageal reflux disease) K21.9    CAD (coronary artery disease) I25.10    Diabetes mellitus type 2, controlled, without complications ([facility] J29.9    Hyperlipidemia E78.5    Chronic low back pain M54.5, G89.29    Oxygen dependent Z99.81    Advanced care planning/counseling discussion Z71.89       Depression Risk Factor Screening:     PHQ over the last two weeks 5/3/2017   Little interest or pleasure in doing things Not at all   Feeling down, depressed or hopeless Not at all   Total Score PHQ 2 0     Alcohol Risk Factor Screening: On any occasion during the past 3 months, have you had more than 3 drinks containing alcohol? No    Do you average more than 7 drinks per week? No      Functional Ability and Level of Safety:     Hearing Loss   none    Activities of Daily Living   Partial assistance. Requires assistance with: ambulation and uses a walker    Fall Risk     Fall Risk Assessment, last 12 mths 5/3/2017   Able to walk? Yes   Fall in past 12 months? No     Abuse Screen   Patient is not abused    Review of Systems   A comprehensive review of systems was negative except for that written in the HPI. Physical Examination     Evaluation of Cognitive Function:  Mood/affect:  neutral  Appearance: age appropriate  Family member/caregiver input: self    No exam performed today, not indicated. .    Patient Care Team:  Christina Salas MD as PCP - General (Internal Medicine)  Eula Chaves MD (Gastroenterology)  Zaria Zelaya RN as Ambulatory Care Navigator  Alfreda Schneider MD as Consulting Provider (Cardiology)  Salima Basilio MD as Consulting Provider (Internal Medicine)    Advice/Referrals/Counseling   Education and counseling provided:  Are appropriate based on today's review and evaluation      Assessment/Plan       ICD-10-CM ICD-9-CM    1. Routine general medical examination at a health care facility Z00.00 V70.0    2.  Screening for alcoholism Z13.89 V79.1            Encounter for immunization Z23 V03.89 PNEUMOCOCCAL POLYSACCHARIDE VACCINE, 23-VALENT, ADULT OR IMMUNOSUPPRESSED PT DOSE,      ADMIN PNEUMOCOCCAL VACCINE   .

## 2017-05-04 DIAGNOSIS — L30.9 DERMATITIS: ICD-10-CM

## 2017-05-04 RX ORDER — CLOTRIMAZOLE AND BETAMETHASONE DIPROPIONATE 10; .64 MG/G; MG/G
CREAM TOPICAL
Qty: 30 G | Refills: 11 | Status: SHIPPED | OUTPATIENT
Start: 2017-05-04

## 2017-05-04 NOTE — TELEPHONE ENCOUNTER
Requested Prescriptions     Pending Prescriptions Disp Refills    tiotropium (SPIRIVA WITH HANDIHALER) 18 mcg inhalation capsule 30 Cap 5     Sig: USE 1 CAPSULE VIA HANDIHALER ONCE A DAY FOR INHALATION    clotrimazole-betamethasone (LOTRISONE) topical cream 30 g 11     Sig: Apply to affected area BID for 2 weeks. Patient uses [facility].     Correct Pharmacy entered on refill

## 2017-05-05 LAB
BASOPHILS # BLD AUTO: 0 X10E3/UL (ref 0–0.2)
BASOPHILS NFR BLD AUTO: 0 %
EOSINOPHIL # BLD AUTO: 0.2 X10E3/UL (ref 0–0.4)
EOSINOPHIL NFR BLD AUTO: 3 %
ERYTHROCYTE [DISTWIDTH] IN BLOOD BY AUTOMATED COUNT: 16.9 % (ref 12.3–15.4)
FOLATE SERPL-MCNC: 10.4 NG/ML
HCT VFR BLD AUTO: 39.8 % (ref 37.5–51)
HGB BLD-MCNC: 13.1 G/DL (ref 12.6–17.7)
IMM GRANULOCYTES # BLD: 0 X10E3/UL (ref 0–0.1)
IMM GRANULOCYTES NFR BLD: 0 %
LYMPHOCYTES # BLD AUTO: 3.2 X10E3/UL (ref 0.7–3.1)
LYMPHOCYTES NFR BLD AUTO: 42 %
MCH RBC QN AUTO: 28.5 PG (ref 26.6–33)
MCHC RBC AUTO-ENTMCNC: 32.9 G/DL (ref 31.5–35.7)
MCV RBC AUTO: 87 FL (ref 79–97)
MONOCYTES # BLD AUTO: 0.4 X10E3/UL (ref 0.1–0.9)
MONOCYTES NFR BLD AUTO: 6 %
NEUTROPHILS # BLD AUTO: 3.7 X10E3/UL (ref 1.4–7)
NEUTROPHILS NFR BLD AUTO: 49 %
PLATELET # BLD AUTO: 190 X10E3/UL (ref 150–379)
RBC # BLD AUTO: 4.59 X10E6/UL (ref 4.14–5.8)
T4 FREE SERPL-MCNC: 1.06 NG/DL (ref 0.82–1.77)
TSH SERPL DL<=0.005 MIU/L-ACNC: 1.23 UIU/ML (ref 0.45–4.5)
VIT B12 SERPL-MCNC: 501 PG/ML (ref 211–946)
WBC # BLD AUTO: 7.6 X10E3/UL (ref 3.4–10.8)

## 2017-06-08 ENCOUNTER — HOSPITAL ENCOUNTER (OUTPATIENT)
Dept: LAB | Age: 72
Discharge: HOME | End: 2017-06-08

## 2017-06-08 ENCOUNTER — OFFICE VISIT (OUTPATIENT)
Dept: INTERNAL MEDICINE CLINIC | Age: 72
End: 2017-06-08

## 2017-06-08 VITALS
TEMPERATURE: 97.7 F | HEART RATE: 106 BPM | BODY MASS INDEX: 28.95 KG/M2 | HEIGHT: 68 IN | DIASTOLIC BLOOD PRESSURE: 66 MMHG | OXYGEN SATURATION: 94 % | RESPIRATION RATE: 18 BRPM | SYSTOLIC BLOOD PRESSURE: 129 MMHG | WEIGHT: 191 LBS

## 2017-06-08 DIAGNOSIS — J40 BRONCHITIS: Primary | ICD-10-CM

## 2017-06-08 DIAGNOSIS — G56.00 CARPAL TUNNEL SYNDROME, UNSPECIFIED LATERALITY: ICD-10-CM

## 2017-06-08 DIAGNOSIS — E78.5 HYPERLIPIDEMIA, UNSPECIFIED HYPERLIPIDEMIA TYPE: Chronic | ICD-10-CM

## 2017-06-08 DIAGNOSIS — E11.9 CONTROLLED TYPE 2 DIABETES MELLITUS WITHOUT COMPLICATION, WITHOUT LONG-TERM CURRENT USE OF INSULIN (HCC): Chronic | ICD-10-CM

## 2017-06-08 PROCEDURE — 99001 SPECIMEN HANDLING PT-LAB: CPT | Performed by: INTERNAL MEDICINE

## 2017-06-08 NOTE — PROGRESS NOTES
Chief Complaint   Patient presents with   Mercedes Lazcano ED Follow-up     [facility] 5/10/17- Acute Bronchitis    Other     requesting a referral to Pulmonary PT/rehab       Pt preferred language for health care discussion is english. Is someone accompanying this pt? no    Is the patient using any DME equipment during OV? no    Depression Screening completed. Yes    Learning Assessment completed. Yes    Abuse Screening completed. Yes    Health Maintenance reviewed and discussed per provider. Yes    Pt is due for Eye exam.  Please order/place referral if appropriate. Advance Directive:  1. Do you have an advance directive in place? Patient Reply:no    2. If not, would you like material regarding how to put one in place? Patient Reply: No    Coordination of Care:  1. Have you been to the ER, urgent care clinic since your last visit? Hospitalized since your last visit? [facility]- Acute Bronchitis    2. Have you seen or consulted any other health care providers outside of the [address] [place] [address] since your last visit? Include any pap smears or colon screening.  no

## 2017-06-08 NOTE — MR AVS SNAPSHOT
Visit Information Date & Time Provider Department Dept. Phone Encounter #  
 6/8/2017  3:00 PM Cherlynn Kayser, [place] [phone] [id number] Follow-up Instructions Return in about 4 months (around 10/8/2017) for HTN, DM, cholesterol. Your Appointments 7/3/2017 10:30 AM  
Office Visit with Cherlynn Kayser, [place] [facility]) Appt Note: 2 month f/u combo clinic  
 [address]  
  
   
 [address] [facility] Upcoming Health Maintenance Date Due  
 EYE EXAM RETINAL OR DILATED Q1 5/2/2017 DTaP/Tdap/Td series (1 - Tdap) 4/18/2018* INFLUENZA AGE 9 TO ADULT 8/1/2017 HEMOGLOBIN A1C Q6M 8/8/2017 FOOT EXAM Q1 2/8/2018 MICROALBUMIN Q1 2/8/2018 LIPID PANEL Q1 2/8/2018 GLAUCOMA SCREENING Q2Y 5/2/2018 MEDICARE YEARLY EXAM 5/4/2018 COLONOSCOPY 6/26/2025 *Topic was postponed. The date shown is not the original due date. Allergies as of 6/8/2017  Review Complete On: 6/8/2017 By: Cherlynn Kayser, MD  
  
 Severity Noted Reaction Type Reactions Ace Inhibitors  10/09/2015    Cough Current Immunizations  Reviewed on 5/3/2017 Name Date Influenza High Dose Vaccine PF 2/8/2017  5:06 PM  
 Influenza Vaccine 10/31/2014 Pneumococcal Conjugate (PCV-13) 12/17/2015 Pneumococcal Polysaccharide (PPSV-23) 5/3/2017  6:02 PM, 10/21/2011 Not reviewed this visit You Were Diagnosed With   
  
 Codes Comments Bronchitis    -  Primary ICD-10-CM: D00 ICD-9-CM: 419 Controlled type 2 diabetes mellitus without complication, without long-term current use of insulin ([facility]     ICD-10-CM: E11.9 ICD-9-CM: 250.00 Hyperlipidemia, unspecified hyperlipidemia type     ICD-10-CM: E78.5 ICD-9-CM: 272.4  Carpal tunnel syndrome, unspecified laterality     ICD-10-CM: G56.00 
 ICD-9-CM: 354.0 Vitals BP Pulse Temp Resp Height(growth percentile) Weight(growth percentile) 129/66 (!) 106 97.7 °F (36.5 °C) (Oral) 18 5' 8\" (1.727 m) 191 lb (86.6 kg) SpO2 BMI Smoking Status 94% 29.04 kg/m2 Former Smoker BMI and BSA Data Body Mass Index Body Surface Area 29.04 kg/m 2 2.04 m 2 Preferred Pharmacy Pharmacy Name Phone [address]  [phone] Your Updated Medication List  
  
   
This list is accurate as of: 6/8/17  3:34 PM.  Always use your most recent med list.  
  
  
  
  
 alcohol swabs Padm Commonly known as:  BD Single Use Swabs Regular Use as directed to test blood sugar  
  
 aspirin, buffered 81 mg Tab Take  by mouth. Blood Glucose Control High&Low Soln Commonly known as:  ACCU-CHEK BRIAN CONTROL SOLN  
use to test meter with each new box of strips and as needed Blood-Glucose Meter Misc Commonly known as:  ACCU-CHEK BRIAN PLUS METER Use to test blood sugar daily or as directed  
  
 bumetanide 1 mg tablet Commonly known as:  Emilia Hove Take one tablet weekly  
  
 clotrimazole-betamethasone topical cream  
Commonly known as:  Eward Domenica Apply to affected area BID for 2 weeks. fluticasone-salmeterol 500-50 mcg/dose diskus inhaler Commonly known as:  ADVAIR DISKUS  
TAKE 1 PUFF BY INHALATION EVERY TWELVE (12) HOURS.  
  
 glucose blood VI test strips strip Commonly known as:  ACCU-CHEK BRIAN PLUS TEST STRP Use to test blood sugar daily Lancets Misc Commonly known as:  ACCU-CHEK SOFTCLIX LANCETS Use to test blood sugar daily  
  
 losartan 50 mg tablet Commonly known as:  COZAAR Take 1 Tab by mouth daily. metFORMIN 1,000 mg tablet Commonly known as:  GLUCOPHAGE Take 1 Tab by mouth two (2) times daily (with meals). metoprolol succinate 25 mg XL tablet Commonly known as:  TOPROL-XL Take 1 tablet by mouth daily. pantoprazole 40 mg tablet Commonly known as:  PROTONIX  
TAKE 1 TABLET BY MOUTH DAILY. potassium chloride SR 10 mEq tablet Commonly known as:  KLOR-CON 10 Take 20 mEq by mouth two (2) times a day. simvastatin 20 mg tablet Commonly known as:  ZOCOR  
TAKE 1 TABLET BY MOUTH EVERY EVENING  
  
 tiotropium 18 mcg inhalation capsule Commonly known as:  SPIRIVA WITH HANDIHALER  
USE 1 CAPSULE VIA HANDIHALER ONCE A DAY FOR INHALATION  
  
 * VENTOLIN HFA 90 mcg/actuation inhaler Generic drug:  albuterol TAKE 2 PUFFS BY INHALATION EVERY FOUR (4) HOURS AS NEEDED FOR WHEEZING  . * albuterol 2.5 mg /3 mL (0.083 %) nebulizer solution Commonly known as:  PROVENTIL VENTOLIN  
3 mL by Nebulization route every four (4) hours as needed for Wheezing. Dispense 100 unit dose vials * albuterol 90 mcg/actuation inhaler Commonly known as:  PROVENTIL HFA, VENTOLIN HFA, PROAIR HFA Take 2 Puffs by inhalation every six (6) hours as needed for Wheezing. * Notice: This list has 3 medication(s) that are the same as other medications prescribed for you. Read the directions carefully, and ask your doctor or other care provider to review them with you. We Performed the Following REFERRAL TO PODIATRY [REF90 Custom] Comments:  
 Please evaluate patient for diabetic foot care. Follow-up Instructions Return in about 4 months (around 10/8/2017) for HTN, DM, cholesterol. To-Do List   
 06/08/2017 Lab:  HEMOGLOBIN A1C W/O EAG   
  
 06/08/2017 Lab:  METABOLIC PANEL, BASIC Referral Information Referral ID Referred By Referred To  
  
 [id number] [facility] Not Available Visits Status Start Date End Date 1 New Request 6/8/17 6/8/18 If your referral has a status of pending review or denied, additional information will be sent to support the outcome of this decision. Introducing [facility] & HEALTH SERVICES! Florecita Fay introduces [employer] patient portal. Now you can access parts of your medical record, email your doctor's office, and request medication refills online. 1. In your internet browser, go to [email or web address] 2. Click on the First Time User? Click Here link in the Sign In box. You will see the New Member Sign Up page. 3. Enter your [employer] Access Code exactly as it appears below. You will not need to use this code after youve completed the sign-up process. If you do not sign up before the expiration date, you must request a new code. · [employer] Access Code: [email or web address] Expires: 9/6/2017  3:34 PM 
 
4. Enter the last four digits of your Social Security Number (xxxx) and Date of Birth (mm/dd/yyyy) as indicated and click Submit. You will be taken to the next sign-up page. 5. Create a [employer] ID. This will be your [employer] login ID and cannot be changed, so think of one that is secure and easy to remember. 6. Create a [employer] password. You can change your password at any time. 7. Enter your Password Reset Question and Answer. This can be used at a later time if you forget your password. 8. Enter your e-mail address. You will receive e-mail notification when new information is available in [address] 9. Click Sign Up. You can now view and download portions of your medical record. 10. Click the Download Summary menu link to download a portable copy of your medical information. If you have questions, please visit the Frequently Asked Questions section of the [employer] website. Remember, [employer] is NOT to be used for urgent needs. For medical emergencies, dial 911. Now available from your iPhone and Android! Please provide this summary of care documentation to your next provider. Your primary care clinician is listed as [facility]. If you have any questions after today's visit, please call [phone].

## 2017-06-08 NOTE — PROGRESS NOTES
HISTORY OF PRESENT ILLNESS  Rod Pink is a 73 y.o. male. Visit Vitals    /66    Pulse (!) 106    Temp 97.7 °F (36.5 °C) (Oral)    Resp 18    Ht 5' 8\" (1.727 m)    Wt 191 lb (86.6 kg)    SpO2 94%  Comment: on 2L O2    BMI 29.04 kg/m2       HPI Comments: Pt was at the ER with bronchitis. He still has slight cough and slight phlegm but is much better. He is on a cough syrup--\"the clear one, not the red. \"  CXR showed some COPD. WBC borderline elevated. Other tests OK. He will be having another CT scan this month followed by seeing Dr. Karina Porter. ED Follow-up   The history is provided by the patient (see comments). This is a new problem. Pertinent negatives include no chest pain. Other   Pertinent negatives include no chest pain. Review of Systems   Constitutional: Negative for chills and fever. Respiratory: Positive for cough and sputum production. But much better   Cardiovascular: Negative for chest pain and palpitations. Physical Exam   Constitutional: He is oriented to person, place, and time. He appears well-developed and well-nourished. No distress. Cardiovascular: Normal rate and regular rhythm. Pulmonary/Chest: Effort normal and breath sounds normal.   Musculoskeletal:   + left wrist Tinel's   Neurological: He is alert and oriented to person, place, and time. Skin: Skin is warm and dry. He is not diaphoretic. Psychiatric: He has a normal mood and affect. Nursing note and vitals reviewed. ASSESSMENT and PLAN    ICD-10-CM ICD-9-CM    1. Bronchitis J40 490    2. Controlled type 2 diabetes mellitus without complication, without long-term current use of insulin ([facility]) A29.2 690.03 METABOLIC PANEL, BASIC      HEMOGLOBIN A1C W/O EAG      REFERRAL TO PODIATRY   3. Hyperlipidemia, unspecified hyperlipidemia type E78.5 272.4    4.  Carpal tunnel syndrome, unspecified laterality G56.00 354.0      Available hospital record reviewed    Bronchitis resolved    DM--due for lab update  Refer to podiatry    F/u 3-4 months

## 2017-06-09 LAB
BUN SERPL-MCNC: 12 MG/DL (ref 8–27)
BUN/CREAT SERPL: 19 (ref 10–24)
CALCIUM SERPL-MCNC: 9.5 MG/DL (ref 8.6–10.2)
CHLORIDE SERPL-SCNC: 97 MMOL/L (ref 96–106)
CO2 SERPL-SCNC: 27 MMOL/L (ref 18–29)
CREAT SERPL-MCNC: 0.62 MG/DL (ref 0.76–1.27)
GLUCOSE SERPL-MCNC: 129 MG/DL (ref 65–99)
HBA1C MFR BLD: 6.8 % (ref 4.8–5.6)
POTASSIUM SERPL-SCNC: 4.4 MMOL/L (ref 3.5–5.2)
SODIUM SERPL-SCNC: 140 MMOL/L (ref 134–144)

## 2017-07-20 ENCOUNTER — TELEPHONE (OUTPATIENT)
Dept: INTERNAL MEDICINE CLINIC | Age: 72
End: 2017-07-20

## 2017-07-20 NOTE — TELEPHONE ENCOUNTER
Patient states he needs his PCP's office to contact [phone] [place] [address] and tell them he needs his ride limit extended due to his Dx and multiple doctors visits and treatments.     [address] [place] [address]  [phone]

## 2017-07-21 NOTE — TELEPHONE ENCOUNTER
Contacted pt at [place]. Two patient Identifiers confirmed. Advised pt per patient. He only gets 24 one way trips yearly to all appointments and once he exceeds that he has to find other transportation until the new kyrie year of 2018. She stated as of now he has used all his visits and will not be able to use [facility] [employer] transportation. She advised no exception could be made. Will contact pt.

## 2017-07-25 ENCOUNTER — OFFICE VISIT (OUTPATIENT)
Dept: INTERNAL MEDICINE CLINIC | Age: 72
End: 2017-07-25

## 2017-07-25 VITALS
DIASTOLIC BLOOD PRESSURE: 70 MMHG | OXYGEN SATURATION: 93 % | SYSTOLIC BLOOD PRESSURE: 124 MMHG | TEMPERATURE: 97.8 F | HEIGHT: 68 IN | HEART RATE: 97 BPM | BODY MASS INDEX: 28.95 KG/M2 | WEIGHT: 191 LBS | RESPIRATION RATE: 18 BRPM

## 2017-07-25 DIAGNOSIS — E78.5 HYPERLIPIDEMIA, UNSPECIFIED HYPERLIPIDEMIA TYPE: Chronic | ICD-10-CM

## 2017-07-25 DIAGNOSIS — Z12.11 SCREEN FOR COLON CANCER: ICD-10-CM

## 2017-07-25 DIAGNOSIS — E11.9 CONTROLLED TYPE 2 DIABETES MELLITUS WITHOUT COMPLICATION, WITHOUT LONG-TERM CURRENT USE OF INSULIN (HCC): Primary | Chronic | ICD-10-CM

## 2017-07-25 RX ORDER — METFORMIN HYDROCHLORIDE 500 MG/1
500 TABLET ORAL 2 TIMES DAILY WITH MEALS
Qty: 180 TAB | Refills: 5 | Status: SHIPPED | OUTPATIENT
Start: 2017-07-25 | End: 2018-08-09 | Stop reason: SDUPTHER

## 2017-07-25 NOTE — PROGRESS NOTES
Chief Complaint   Patient presents with    Diabetes    Hypertension       Pt preferred language for health care discussion is [place]. Is someone accompanying this pt? son    Is the patient using any DME equipment during Recardo Gabriele? Rollator and O2 @ 2L    Depression Screening:  PHQ over the last two weeks 7/25/2017 6/8/2017 5/3/2017 2/8/2017 6/26/2015 8/5/2014   Little interest or pleasure in doing things Not at all Not at all Not at all Not at all Not at all Not at all   Feeling down, depressed or hopeless Not at all Not at all Not at all Not at all Not at all Not at all   Total Score PHQ 2 0 0 0 0 0 0       Learning Assessment:  Learning Assessment 8/5/2014   PRIMARY LEARNER Patient   HIGHEST LEVEL OF EDUCATION - PRIMARY LEARNER  GRADUATED Alexi Dunlap 95 PRIMARY LEARNER NONE   CO-LEARNER CAREGIVER No   PRIMARY LANGUAGE ENGLISH   LEARNER PREFERENCE PRIMARY DEMONSTRATION     LISTENING     READING   ANSWERED BY patient   RELATIONSHIP SELF       Abuse Screening:  Abuse Screening Questionnaire 3/16/2016 2/18/2016 1/21/2016 11/27/2015 10/30/2015 10/9/2015 8/25/2015   Do you ever feel afraid of your partner? N N N N N N N   Are you in a relationship with someone who physically or mentally threatens you? N N N N N N N   Is it safe for you to go home? Muna PEARCE       Fall Risk  Fall Risk Assessment, last 12 mths 7/25/2017 6/8/2017 5/3/2017 2/8/2017 6/26/2015 8/5/2014   Able to walk? Yes Yes Yes Yes Yes Yes   Fall in past 12 months? No No No No No No         Health Maintenance reviewed and discussed per provider. Yes    Pt is due for Diabetic eye exam, (med rec request filled out and faxed)  Please order/place referral if appropriate. Advance Directive:  1. Do you have an advance directive in place? Patient Reply:no    2. If not, would you like material regarding how to put one in place? Patient Reply: no    Coordination of Care:  1. Have you been to the ER, urgent care clinic since your last visit? Hospitalized since your last visit? no    2. Have you seen or consulted any other health care providers outside of the [address] [place] [address] since your last visit? Include any pap smears or colon screening.  no

## 2017-07-25 NOTE — MR AVS SNAPSHOT
Visit Information Date & Time Provider Department Dept. Phone Encounter #  
 7/25/2017  5:00 PM [address] [phone] [id number] Follow-up Instructions Return in about 8 weeks (around 9/19/2017) for HTN, DM, cholesterol. Upcoming Health Maintenance Date Due  
 EYE EXAM RETINAL OR DILATED Q1 5/2/2017 DTaP/Tdap/Td series (1 - Tdap) 4/18/2018* INFLUENZA AGE 9 TO ADULT 8/1/2017 HEMOGLOBIN A1C Q6M 12/8/2017 FOOT EXAM Q1 2/8/2018 MICROALBUMIN Q1 2/8/2018 LIPID PANEL Q1 2/8/2018 GLAUCOMA SCREENING Q2Y 5/2/2018 MEDICARE YEARLY EXAM 5/4/2018 COLONOSCOPY 6/26/2025 *Topic was postponed. The date shown is not the original due date. Allergies as of 7/25/2017  Review Complete On: 7/25/2017 By: Jaja Bernal MD  
  
 Severity Noted Reaction Type Reactions Ace Inhibitors  10/09/2015    Cough Current Immunizations  Reviewed on 5/3/2017 Name Date Influenza High Dose Vaccine PF 2/8/2017  5:06 PM  
 Influenza Vaccine 10/31/2014 Pneumococcal Conjugate (PCV-13) 12/17/2015 Pneumococcal Polysaccharide (PPSV-23) 5/3/2017  6:02 PM, 10/21/2011 Not reviewed this visit You Were Diagnosed With   
  
 Codes Comments Controlled type 2 diabetes mellitus without complication, without long-term current use of insulin ([facility]    -  Primary ICD-10-CM: E11.9 ICD-9-CM: 250.00 Hyperlipidemia, unspecified hyperlipidemia type     ICD-10-CM: E78.5 ICD-9-CM: 272.4 Screen for colon cancer     ICD-10-CM: Z12.11 ICD-9-CM: V76.51 Vitals BP Pulse Temp Resp Height(growth percentile) Weight(growth percentile) 124/70 97 97.8 °F (36.6 °C) (Oral) 18 5' 8\" (1.727 m) 191 lb (86.6 kg) SpO2 BMI Smoking Status 93% 29.04 kg/m2 Former Smoker BMI and BSA Data Body Mass Index Body Surface Area 29.04 kg/m 2 2.04 m 2 Preferred Pharmacy Pharmacy Name Phone [address]  [phone] Your Updated Medication List  
  
   
This list is accurate as of: 7/25/17  5:23 PM.  Always use your most recent med list.  
  
  
  
  
 alcohol swabs Padm Commonly known as:  BD Single Use Swabs Regular Use as directed to test blood sugar  
  
 aspirin, buffered 81 mg Tab Take  by mouth. Blood Glucose Control High&Low Soln Commonly known as:  ACCU-CHEK BRIAN CONTROL SOLN  
use to test meter with each new box of strips and as needed Blood-Glucose Meter Misc Commonly known as:  ACCU-CHEK BRIAN PLUS METER Use to test blood sugar daily or as directed  
  
 bumetanide 1 mg tablet Commonly known as:  Fabián Patron Take one tablet weekly  
  
 clotrimazole-betamethasone topical cream  
Commonly known as:  Lillette Mercury Apply to affected area BID for 2 weeks. fluticasone-salmeterol 500-50 mcg/dose diskus inhaler Commonly known as:  ADVAIR DISKUS  
TAKE 1 PUFF BY INHALATION EVERY TWELVE (12) HOURS.  
  
 glucose blood VI test strips strip Commonly known as:  ACCU-CHEK BRIAN PLUS TEST STRP Use to test blood sugar daily Lancets Misc Commonly known as:  ACCU-CHEK SOFTCLIX LANCETS Use to test blood sugar daily  
  
 losartan 50 mg tablet Commonly known as:  COZAAR Take 1 Tab by mouth daily. metFORMIN 500 mg tablet Commonly known as:  GLUCOPHAGE Take 1 Tab by mouth two (2) times daily (with meals). metoprolol succinate 25 mg XL tablet Commonly known as:  TOPROL-XL Take 1 tablet by mouth daily. pantoprazole 40 mg tablet Commonly known as:  PROTONIX  
TAKE 1 TABLET BY MOUTH DAILY. potassium chloride SR 10 mEq tablet Commonly known as:  KLOR-CON 10 Take 20 mEq by mouth two (2) times a day. simvastatin 20 mg tablet Commonly known as:  ZOCOR  
TAKE 1 TABLET BY MOUTH EVERY EVENING  
  
 tiotropium 18 mcg inhalation capsule Commonly known as:  SPIRIVA WITH HANDIHALER  
USE 1 CAPSULE VIA HANDIHALER ONCE A DAY FOR INHALATION  
  
 * VENTOLIN HFA 90 mcg/actuation inhaler Generic drug:  albuterol TAKE 2 PUFFS BY INHALATION EVERY FOUR (4) HOURS AS NEEDED FOR WHEEZING  . * albuterol 2.5 mg /3 mL (0.083 %) nebulizer solution Commonly known as:  PROVENTIL VENTOLIN  
3 mL by Nebulization route every four (4) hours as needed for Wheezing. Dispense 100 unit dose vials * albuterol 90 mcg/actuation inhaler Commonly known as:  PROVENTIL HFA, VENTOLIN HFA, PROAIR HFA Take 2 Puffs by inhalation every six (6) hours as needed for Wheezing. * Notice: This list has 3 medication(s) that are the same as other medications prescribed for you. Read the directions carefully, and ask your doctor or other care provider to review them with you. Prescriptions Sent to Pharmacy Refills  
 metFORMIN (GLUCOPHAGE) 500 mg tablet 5 Sig: Take 1 Tab by mouth two (2) times daily (with meals). Class: Normal  
 Pharmacy: Gulshan Zhang [address] [place] [address] #: [phone] Route: Oral  
  
Follow-up Instructions Return in about 8 weeks (around 9/19/2017) for HTN, DM, cholesterol. To-Do List   
 07/25/2017 Lab:  OCCULT BLOOD, IMMUNOASSAY (FIT) Introducing [facility] HE[facility]! [facility] introduces [employer] patient portal. Now you can access parts of your medical record, email your doctor's office, and request medication refills online. 1. In your internet browser, go to [email or web address] 2. Click on the First Time User? Click Here link in the Sign In box. You will see the New Member Sign Up page. 3. Enter your [employer] Access Code exactly as it appears below. You will not need to use this code after youve completed the sign-up process. If you do not sign up before the expiration date, you must request a new code. · [employer] Access Code: [email or web address] Expires: 9/6/2017  3:34 PM 
 
4. Enter the last four digits of your Social Security Number (xxxx) and Date of Birth (mm/dd/yyyy) as indicated and click Submit. You will be taken to the next sign-up page. 5. Create a [employer] ID. This will be your [employer] login ID and cannot be changed, so think of one that is secure and easy to remember. 6. Create a [employer] password. You can change your password at any time. 7. Enter your Password Reset Question and Answer. This can be used at a later time if you forget your password. 8. Enter your e-mail address. You will receive e-mail notification when new information is available in [address] 9. Click Sign Up. You can now view and download portions of your medical record. 10. Click the Download Summary menu link to download a portable copy of your medical information. If you have questions, please visit the Frequently Asked Questions section of the [employer] website. Remember, [employer] is NOT to be used for urgent needs. For medical emergencies, dial 911. Now available from your iPhone and Android! Please provide this summary of care documentation to your next provider. Your primary care clinician is listed as [facility]. If you have any questions after today's visit, please call [phone].

## 2017-07-25 NOTE — TELEPHONE ENCOUNTER
Spoke with pt in the office, advised of below message. Pt states that he is going to be switching health ins companies.     Be advised

## 2017-07-25 NOTE — PROGRESS NOTES
HISTORY OF PRESENT ILLNESS  Faheem Reyes is a 81 y.o. male. Visit Vitals    /70    Pulse 97    Temp 97.8 °F (36.6 °C) (Oral)    Resp 18    Ht 5' 8\" (1.727 m)    Wt 191 lb (86.6 kg)    SpO2 93%  Comment: currently on Room Air    BMI 29.04 kg/m2       Diabetes   The history is provided by the patient. This is a chronic problem. The current episode started more than 1 week ago. The problem occurs daily. The problem has not changed since onset. Pertinent negatives include no chest pain, no headaches and no shortness of breath. Exacerbated by: diet. The symptoms are relieved by medications (diet). Treatments tried: see med list. The treatment provided moderate relief. Hypertension    The history is provided by the patient. This is a chronic problem. The current episode started more than 1 week ago. Pertinent negatives include no chest pain, no palpitations, no headaches and no shortness of breath. There are no associated agents to hypertension. Review of Systems   Constitutional: Negative for chills and fever. Respiratory: Negative for cough and shortness of breath. Cardiovascular: Negative for chest pain and palpitations. Neurological: Negative for headaches. Physical Exam   Constitutional: He is oriented to person, place, and time. He appears well-developed and well-nourished. No distress. Cardiovascular: Normal rate and regular rhythm. Pulmonary/Chest: Effort normal and breath sounds normal.   Musculoskeletal: He exhibits no edema. Neurological: He is alert and oriented to person, place, and time. Skin: Skin is warm and dry. He is not diaphoretic. Psychiatric: He has a normal mood and affect. Nursing note and vitals reviewed. ASSESSMENT and PLAN    ICD-10-CM ICD-9-CM    1. Controlled type 2 diabetes mellitus without complication, without long-term current use of insulin (HCC) E11.9 250.00    2. Hyperlipidemia, unspecified hyperlipidemia type E78.5 272.4    3. Screen for colon cancer Z12.11 V76.51 OCCULT BLOOD, IMMUNOASSAY (FIT)       BP controlled    Last BS in June was excellent    Discussed colonoscopy--he does not want, but he will agree for stool card    But he also has a spiculated lung mass that is being evaluated by pulmonary. It is felt likely to be a malignancy.     Will f/u in September when due for lab

## 2017-07-27 RX ORDER — PREDNISONE 10 MG/1
50 TABLET ORAL DAILY
Qty: 25 TAB | Refills: 11 | Status: SHIPPED | OUTPATIENT
Start: 2017-07-27 | End: 2017-07-27 | Stop reason: SDUPTHER

## 2017-07-27 RX ORDER — PREDNISONE 20 MG/1
60 TABLET ORAL DAILY
Qty: 15 TAB | Status: SHIPPED | OUTPATIENT
Start: 2017-07-27 | End: 2017-08-01

## 2017-08-14 RX ORDER — ALBUTEROL SULFATE 90 UG/1
AEROSOL, METERED RESPIRATORY (INHALATION)
Qty: 3 INHALER | Refills: 3 | Status: SHIPPED | OUTPATIENT
Start: 2017-08-14 | End: 2017-09-19 | Stop reason: SDUPTHER

## 2017-09-19 ENCOUNTER — HOSPITAL ENCOUNTER (OUTPATIENT)
Dept: LAB | Age: 72
Discharge: HOME | End: 2017-09-19

## 2017-09-19 ENCOUNTER — OFFICE VISIT (OUTPATIENT)
Dept: INTERNAL MEDICINE CLINIC | Age: 72
End: 2017-09-19

## 2017-09-19 VITALS
SYSTOLIC BLOOD PRESSURE: 118 MMHG | BODY MASS INDEX: 28.95 KG/M2 | HEART RATE: 79 BPM | RESPIRATION RATE: 18 BRPM | DIASTOLIC BLOOD PRESSURE: 70 MMHG | HEIGHT: 68 IN | TEMPERATURE: 98.7 F | WEIGHT: 191 LBS | OXYGEN SATURATION: 92 %

## 2017-09-19 DIAGNOSIS — Z23 ENCOUNTER FOR IMMUNIZATION: ICD-10-CM

## 2017-09-19 DIAGNOSIS — Z76.0 MEDICATION REFILL: ICD-10-CM

## 2017-09-19 DIAGNOSIS — E11.9 CONTROLLED TYPE 2 DIABETES MELLITUS WITHOUT COMPLICATION, WITHOUT LONG-TERM CURRENT USE OF INSULIN (HCC): Primary | Chronic | ICD-10-CM

## 2017-09-19 DIAGNOSIS — E78.5 HYPERLIPIDEMIA, UNSPECIFIED HYPERLIPIDEMIA TYPE: Chronic | ICD-10-CM

## 2017-09-19 DIAGNOSIS — J44.9 CHRONIC OBSTRUCTIVE PULMONARY DISEASE, UNSPECIFIED COPD TYPE (HCC): Chronic | ICD-10-CM

## 2017-09-19 PROCEDURE — 99001 SPECIMEN HANDLING PT-LAB: CPT | Performed by: INTERNAL MEDICINE

## 2017-09-19 RX ORDER — FLUTICASONE PROPIONATE AND SALMETEROL 500; 50 UG/1; UG/1
POWDER RESPIRATORY (INHALATION)
Qty: 3 INHALER | Refills: 11 | Status: SHIPPED | OUTPATIENT
Start: 2017-09-19 | End: 2018-09-28 | Stop reason: SDUPTHER

## 2017-09-19 RX ORDER — PREDNISONE 20 MG/1
TABLET ORAL
COMMUNITY
Start: 2017-08-28 | End: 2018-02-05 | Stop reason: ALTCHOICE

## 2017-09-19 RX ORDER — IPRATROPIUM BROMIDE AND ALBUTEROL SULFATE 2.5; .5 MG/3ML; MG/3ML
SOLUTION RESPIRATORY (INHALATION)
COMMUNITY
Start: 2017-08-28 | End: 2018-05-31 | Stop reason: SDUPTHER

## 2017-09-19 RX ORDER — ALBUTEROL SULFATE 90 UG/1
AEROSOL, METERED RESPIRATORY (INHALATION)
Qty: 3 INHALER | Refills: 11 | Status: SHIPPED | OUTPATIENT
Start: 2017-09-19 | End: 2018-10-09 | Stop reason: SDUPTHER

## 2017-09-19 NOTE — PROGRESS NOTES
HISTORY OF PRESENT ILLNESS  Virginia Maria is a 81 y.o. male. Visit Vitals    /70 (BP 1 Location: Right arm, BP Patient Position: Sitting)    Pulse 79    Temp 98.7 °F (37.1 °C) (Oral)    Resp 18    Ht 5' 8\" (1.727 m)    Wt 191 lb (86.6 kg)    SpO2 92%  Comment: 2LNC    BMI 29.04 kg/m2       HPI Comments: Reports having a lung biopsy done almost a month ago [facility]. He usually sees Dr. Damaris Childs. He was told he would be getting radiation. 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 Hypertension    The history is provided by the patient. This is a chronic problem. The current episode started more than 1 week ago. The problem has not changed since onset. Associated symptoms include shortness of breath. Diabetes   Associated symptoms include shortness of breath. Cholesterol Problem   Associated symptoms include shortness of breath. Medication Refill   Associated symptoms include shortness of breath. Review of Systems   Constitutional: Negative. Respiratory: Positive for cough and shortness of breath. Cardiovascular: Negative. Physical Exam   Constitutional: He is oriented to person, place, and time. He appears well-developed and well-nourished. No distress. Cardiovascular: Normal rate and regular rhythm. Pulmonary/Chest: Effort normal.   Few slight rhonchi on left, clearing with deep breath   Musculoskeletal: He exhibits no edema. Neurological: He is alert and oriented to person, place, and time. Skin: Skin is warm and dry. He is not diaphoretic. Psychiatric: He has a normal mood and affect. Nursing note and vitals reviewed. ASSESSMENT and PLAN    ICD-10-CM ICD-9-CM    1. Controlled type 2 diabetes mellitus without complication, without long-term current use of insulin ([facility]) A73.2 449.85 METABOLIC PANEL, BASIC      HEMOGLOBIN A1C W/O EAG      LIPID PANEL   2. Chronic obstructive pulmonary disease, unspecified COPD type ([facility] J44.9 496    3.  Hyperlipidemia, unspecified hyperlipidemia type E78.5 272.4 LIPID PANEL   4. Medication refill Z76.0 V68.1 albuterol (VENTOLIN HFA) 90 mcg/actuation inhaler      fluticasone-salmeterol (ADVAIR DISKUS) 500-50 mcg/dose diskus inhaler   5.  Encounter for immunization Z23 V03.89 INFLUENZA VIRUS VACCINE, HIGH DOSE SEASONAL, PRESERVATIVE FREE      ADMIN INFLUENZA VIRUS VAC     Update lab    Refill meds today    Pt will f/u with Dr. Liane godinez radiation oncology referral    F/u 4 months

## 2017-09-19 NOTE — MR AVS SNAPSHOT
Visit Information Date & Time Provider Department Dept. Phone Encounter #  
 9/19/2017 11:00 AM [address] [phone] [id number] Follow-up Instructions Return in about 4 months (around 1/19/2018) for HTN, DM, cholesterol. Upcoming Health Maintenance Date Due INFLUENZA AGE 9 TO ADULT 8/1/2017 DTaP/Tdap/Td series (1 - Tdap) 4/18/2018* HEMOGLOBIN A1C Q6M 12/8/2017 FOOT EXAM Q1 2/8/2018 MICROALBUMIN Q1 2/8/2018 LIPID PANEL Q1 2/8/2018 MEDICARE YEARLY EXAM 5/4/2018 EYE EXAM RETINAL OR DILATED Q1 5/17/2018 GLAUCOMA SCREENING Q2Y 5/17/2019 COLONOSCOPY 6/26/2025 *Topic was postponed. The date shown is not the original due date. Allergies as of 9/19/2017  Review Complete On: 9/19/2017 By: Connie Kenyon MD  
  
 Severity Noted Reaction Type Reactions Ace Inhibitors  10/09/2015    Cough Current Immunizations  Reviewed on 5/3/2017 Name Date Influenza High Dose Vaccine PF  Incomplete, 2/8/2017  5:06 PM  
 Influenza Vaccine 10/10/2015 12:00 AM, 10/31/2014, 10/3/2013 12:00 AM, 12/13/2012 12:00 AM, 10/21/2011 12:00 AM, 2/4/2010 12:00 AM  
 Novel Influenza-H1N1-09, All Formulations 3/2/2010 12:00 AM  
 Pneumococcal Conjugate (PCV-13) 12/17/2015 12:00 AM  
 Pneumococcal Polysaccharide (PPSV-23) 5/3/2017  6:02 PM, 10/21/2011 12:00 AM  
  
 Not reviewed this visit You Were Diagnosed With   
  
 Codes Comments Controlled type 2 diabetes mellitus without complication, without long-term current use of insulin ([facility]    -  Primary ICD-10-CM: E11.9 ICD-9-CM: 250.00 Chronic obstructive pulmonary disease, unspecified COPD type ([facility]     ICD-10-CM: J44.9 ICD-9-CM: 984 Hyperlipidemia, unspecified hyperlipidemia type     ICD-10-CM: E78.5 ICD-9-CM: 272.4 Medication refill     ICD-10-CM: Z76.0 ICD-9-CM: V68.1 Encounter for immunization     ICD-10-CM: L20 ICD-9-CM: V03.89   
  
 Vitals BP Pulse Temp Resp Height(growth percentile) Weight(growth percentile) 118/70 (BP 1 Location: Right arm, BP Patient Position: Sitting) 79 98.7 °F (37.1 °C) (Oral) 18 5' 8\" (1.727 m) 191 lb (86.6 kg) SpO2 BMI Smoking Status 92% 29.04 kg/m2 Former Smoker Vitals History BMI and BSA Data Body Mass Index Body Surface Area 29.04 kg/m 2 2.04 m 2 Preferred Pharmacy Pharmacy Name Phone [address]  [phone] Your Updated Medication List  
  
   
This list is accurate as of: 9/19/17 12:37 PM.  Always use your most recent med list.  
  
  
  
  
 * albuterol 2.5 mg /3 mL (0.083 %) nebulizer solution Commonly known as:  PROVENTIL VENTOLIN  
3 mL by Nebulization route every four (4) hours as needed for Wheezing. Dispense 100 unit dose vials * albuterol 90 mcg/actuation inhaler Commonly known as:  VENTOLIN HFA  
USE 2 PUFFS EVERY FOUR HOURS  
  
 albuterol-ipratropium 2.5 mg-0.5 mg/3 ml Nebu Commonly known as:  DUO-NEB  
  
 alcohol swabs Padm Commonly known as:  BD Single Use Swabs Regular Use as directed to test blood sugar  
  
 aspirin, buffered 81 mg Tab Take  by mouth. Blood Glucose Control High&Low Soln Commonly known as:  ACCU-CHEK BRIAN CONTROL SOLN  
use to test meter with each new box of strips and as needed Blood-Glucose Meter Misc Commonly known as:  ACCU-CHEK BRIAN PLUS METER Use to test blood sugar daily or as directed  
  
 bumetanide 1 mg tablet Commonly known as:  Carson Tesfayeam Take one tablet weekly  
  
 clotrimazole-betamethasone topical cream  
Commonly known as:  Gloria Analisa Apply to affected area BID for 2 weeks. fluticasone-salmeterol 500-50 mcg/dose diskus inhaler Commonly known as:  ADVAIR DISKUS  
TAKE 1 PUFF BY INHALATION EVERY TWELVE (12) HOURS.  
  
 glucose blood VI test strips strip Commonly known as:  ACCU-CHEK BRIAN PLUS TEST STRP  
 Use to test blood sugar daily Lancets Misc Commonly known as:  ACCU-CHEK SOFTCLIX LANCETS Use to test blood sugar daily  
  
 losartan 50 mg tablet Commonly known as:  COZAAR Take 1 Tab by mouth daily. metFORMIN 500 mg tablet Commonly known as:  GLUCOPHAGE Take 1 Tab by mouth two (2) times daily (with meals). metoprolol succinate 25 mg XL tablet Commonly known as:  TOPROL-XL Take 1 tablet by mouth daily. pantoprazole 40 mg tablet Commonly known as:  PROTONIX  
TAKE 1 TABLET BY MOUTH DAILY. potassium chloride SR 10 mEq tablet Commonly known as:  KLOR-CON 10 Take 20 mEq by mouth two (2) times a day. predniSONE 20 mg tablet Commonly known as:  DELTASONE  
  
 simvastatin 20 mg tablet Commonly known as:  ZOCOR  
TAKE 1 TABLET BY MOUTH EVERY EVENING  
  
 tiotropium 18 mcg inhalation capsule Commonly known as:  [address]  
USE 1 CAPSULE VIA HANDIHALER ONCE A DAY FOR INHALATION  
  
 * Notice: This list has 2 medication(s) that are the same as other medications prescribed for you. Read the directions carefully, and ask your doctor or other care provider to review them with you. Prescriptions Sent to Pharmacy Refills  
 albuterol (VENTOLIN HFA) 90 mcg/actuation inhaler 11 Sig: USE 2 PUFFS EVERY FOUR HOURS Class: Normal  
 Pharmacy: [address] [place] [address] Ph #: [phone]  
 fluticasone-salmeterol (ADVAIR DISKUS) 500-50 mcg/dose diskus inhaler 11 Sig: TAKE 1 PUFF BY INHALATION EVERY TWELVE (12) HOURS. Class: Normal  
 Pharmacy: [address] [place] [address] Ph #: [phone] We Performed the Following ADMIN INFLUENZA VIRUS VAC [ [facility]] INFLUENZA VIRUS VACCINE, HIGH DOSE SEASONAL, PRESERVATIVE FREE [01013 CPT(R)] Follow-up Instructions Return in about 4 months (around 1/19/2018) for HTN, DM, cholesterol.   
  
To-Do List   
 09/19/2017 Lab:  HEMOGLOBIN A1C W/O EAG   
  
 09/19/2017 Lab:  LIPID PANEL   
  
 09/19/2017 Lab:  METABOLIC PANEL, BASIC Introducing [facility]! [facility] introduces "[employer]" patient portal. Now you can access parts of your medical record, email your doctor's office, and request medication refills online. 1. In your internet browser, go to [email or web address] 2. Click on the First Time User? Click Here link in the Sign In box. You will see the New Member Sign Up page. 3. Enter your "[employer]" Access Code exactly as it appears below. You will not need to use this code after youve completed the sign-up process. If you do not sign up before the expiration date, you must request a new code. · "[employer]" Access Code: [email or web address] Expires: 12/18/2017 12:37 PM 
 
4. Enter the last four digits of your Social Security Number (xxxx) and Date of Birth (mm/dd/yyyy) as indicated and click Submit. You will be taken to the next sign-up page. 5. Create a "[employer]" ID. This will be your "[employer]" login ID and cannot be changed, so think of one that is secure and easy to remember. 6. Create a "[employer]" password. You can change your password at any time. 7. Enter your Password Reset Question and Answer. This can be used at a later time if you forget your password. 8. Enter your e-mail address. You will receive e-mail notification when new information is available in [address] 9. Click Sign Up. You can now view and download portions of your medical record. 10. Click the Download Summary menu link to download a portable copy of your medical information. If you have questions, please visit the Frequently Asked Questions section of the "[employer]" website. Remember, "[employer]" is NOT to be used for urgent needs. For medical emergencies, dial 911. Now available from your iPhone and Android! Please provide this summary of care documentation to your next provider. Your primary care clinician is listed as [facility]. If you have any questions after today's visit, please call [phone].

## 2017-09-19 NOTE — PROGRESS NOTES
[address] presents today for   Chief Complaint   Patient presents with    Hypertension    Diabetes    Cholesterol Problem    Medication Refill     Requested Prescriptions     Pending Prescriptions Disp Refills    albuterol (VENTOLIN HFA) 90 mcg/actuation inhaler 3 Inhaler 3    fluticasone-salmeterol (ADVAIR DISKUS) 500-50 mcg/dose diskus inhaler 3 Inhaler 10     Sig: TAKE 1 PUFF BY INHALATION EVERY TWELVE (12) HOURS. Felix Bone preferred language for health care discussion is english/other. Is someone accompanying this pt? Yes, son    Is the patient using any DME equipment during [address]? Yes, seated walker    Depression Screening:  PHQ over the last two weeks 9/19/2017 7/25/2017 6/8/2017 5/3/2017 2/8/2017 6/26/2015 8/5/2014   Little interest or pleasure in doing things Not at all Not at all Not at all Not at all Not at all Not at all Not at all   Feeling down, depressed or hopeless Not at all Not at all Not at all Not at all Not at all Not at all Not at all   Total Score PHQ 2 0 0 0 0 0 0 0       Learning Assessment:  Learning Assessment 8/5/2014   PRIMARY LEARNER Patient   HIGHEST LEVEL OF EDUCATION - PRIMARY LEARNER  GRADUATED Alexi Dunlap 95 PRIMARY LEARNER NONE   CO-LEARNER CAREGIVER No   PRIMARY LANGUAGE ENGLISH   LEARNER PREFERENCE PRIMARY DEMONSTRATION     LISTENING     READING   ANSWERED BY patient   RELATIONSHIP SELF       Abuse Screening:  Abuse Screening Questionnaire 9/19/2017 3/16/2016 2/18/2016 1/21/2016 11/27/2015 10/30/2015 10/9/2015   Do you ever feel afraid of your partner? N N N N N N N   Are you in a relationship with someone who physically or mentally threatens you? N N N N N N N   Is it safe for you to go home? Gely PEARCE       Fall Risk  Fall Risk Assessment, last 12 mths 9/19/2017 7/25/2017 6/8/2017 5/3/2017 2/8/2017 6/26/2015 8/5/2014   Able to walk? Yes Yes Yes Yes Yes Yes Yes   Fall in past 12 months?  No No No No No No No Health Maintenance reviewed and discussed per provider. Yes    Ky Blount is due for influenza vax. Please order/place referral if appropriate. Advance Directive:  1. Do you have an advance directive in place? Patient Reply: no    2. If not, would you like material regarding how to put one in place? Patient Reply: no    Coordination of Care:  1. Have you been to the ER, urgent care clinic since your last visit? Hospitalized since your last visit? no    2. Have you seen or consulted any other health care providers outside of the [address] [place] [address] since your last visit? Include any pap smears or colon screening.  Lung biopsy c Dr. Andi Dixon

## 2017-09-20 LAB
BUN SERPL-MCNC: 14 MG/DL (ref 8–27)
BUN/CREAT SERPL: 24 (ref 10–24)
CALCIUM SERPL-MCNC: 9.6 MG/DL (ref 8.6–10.2)
CHLORIDE SERPL-SCNC: 97 MMOL/L (ref 96–106)
CHOLEST SERPL-MCNC: 170 MG/DL (ref 100–199)
CO2 SERPL-SCNC: 31 MMOL/L (ref 18–29)
CREAT SERPL-MCNC: 0.58 MG/DL (ref 0.76–1.27)
GLUCOSE SERPL-MCNC: 107 MG/DL (ref 65–99)
HBA1C MFR BLD: 6.7 % (ref 4.8–5.6)
HDLC SERPL-MCNC: 56 MG/DL
INTERPRETATION, 910389: NORMAL
LDLC SERPL CALC-MCNC: 87 MG/DL (ref 0–99)
POTASSIUM SERPL-SCNC: 4.4 MMOL/L (ref 3.5–5.2)
SODIUM SERPL-SCNC: 141 MMOL/L (ref 134–144)
TRIGL SERPL-MCNC: 135 MG/DL (ref 0–149)
VLDLC SERPL CALC-MCNC: 27 MG/DL (ref 5–40)

## 2017-10-05 ENCOUNTER — TELEPHONE (OUTPATIENT)
Dept: INTERNAL MEDICINE CLINIC | Age: 72
End: 2017-10-05

## 2017-10-05 NOTE — TELEPHONE ENCOUNTER
Lyubov with [address] called stating she needs a [employer] Referral for the patient's treatments. States she has made several calls in reference to this. Please contact Lyubov at [phone] for more information.

## 2017-10-10 NOTE — TELEPHONE ENCOUNTER
Contacted Lyubov in regards to additional information for [employer] referral however Maryelizabeth Nageotte was not able to provide the necessary information she transferred the call to a nurse desk for more info. Left message with nurse to contact this office for further information.

## 2018-01-24 RX ORDER — TIOTROPIUM BROMIDE 18 UG/1
CAPSULE ORAL; RESPIRATORY (INHALATION)
Qty: 30 CAP | Refills: 5 | Status: SHIPPED | OUTPATIENT
Start: 2018-01-24 | End: 2021-05-18 | Stop reason: SDUPTHER

## 2018-02-05 ENCOUNTER — OFFICE VISIT (OUTPATIENT)
Dept: INTERNAL MEDICINE CLINIC | Age: 73
End: 2018-02-05

## 2018-02-05 ENCOUNTER — HOSPITAL ENCOUNTER (OUTPATIENT)
Dept: LAB | Age: 73
Discharge: HOME | End: 2018-02-05

## 2018-02-05 VITALS
TEMPERATURE: 98.5 F | BODY MASS INDEX: 29.1 KG/M2 | HEIGHT: 68 IN | SYSTOLIC BLOOD PRESSURE: 131 MMHG | WEIGHT: 192 LBS | HEART RATE: 93 BPM | RESPIRATION RATE: 15 BRPM | OXYGEN SATURATION: 93 % | DIASTOLIC BLOOD PRESSURE: 78 MMHG

## 2018-02-05 DIAGNOSIS — E11.9 CONTROLLED TYPE 2 DIABETES MELLITUS WITHOUT COMPLICATION, WITHOUT LONG-TERM CURRENT USE OF INSULIN (HCC): Primary | Chronic | ICD-10-CM

## 2018-02-05 DIAGNOSIS — M17.0 PRIMARY OSTEOARTHRITIS OF BOTH KNEES: ICD-10-CM

## 2018-02-05 DIAGNOSIS — R05.9 COUGH: ICD-10-CM

## 2018-02-05 DIAGNOSIS — E78.5 HYPERLIPIDEMIA, UNSPECIFIED HYPERLIPIDEMIA TYPE: Chronic | ICD-10-CM

## 2018-02-05 DIAGNOSIS — J44.9 CHRONIC OBSTRUCTIVE PULMONARY DISEASE, UNSPECIFIED COPD TYPE (HCC): Chronic | ICD-10-CM

## 2018-02-05 PROCEDURE — 99001 SPECIMEN HANDLING PT-LAB: CPT | Performed by: INTERNAL MEDICINE

## 2018-02-05 RX ORDER — CODEINE PHOSPHATE AND GUAIFENESIN 10; 100 MG/5ML; MG/5ML
5 SOLUTION ORAL
Qty: 180 ML | Refills: 0 | Status: SHIPPED | OUTPATIENT
Start: 2018-02-05 | End: 2018-04-10 | Stop reason: SDUPTHER

## 2018-02-05 NOTE — PROGRESS NOTES
HISTORY OF PRESENT ILLNESS  Honey Santana is a 81 y.o. male. Visit Vitals    /78    Pulse 93    Temp 98.5 °F (36.9 °C) (Oral)    Resp 15    Ht 5' 8\" (1.727 m)    Wt 192 lb (87.1 kg)    SpO2 93%  Comment: 2l O2, pt O2 sat was 89% on room air    BMI 29.19 kg/m2       HPI Comments: Just getting over a respiratory infection. Still taking some robitussin DM at night. Would like something stronger. But his phlegm is clearing up some. He does not have any fever or chills. Cough is a little worse at night. He has underlying COPD and is on home O2 at 2 LPM    He also had XRT/cyber? Knife in December for lung tumor. Goes back to see Dr. Miracle Espinoza in a couple of weeks. Squamous cell carcinoma of the RONNIE/apex        Hypertension    The history is provided by the patient. This is a chronic problem. The current episode started more than 1 week ago. The problem has not changed since onset. There are no associated agents to hypertension. Risk factors include male gender and hypertension. Diabetes   The history is provided by the patient. This is a chronic problem. The current episode started more than 1 week ago. The problem occurs daily. The problem has not changed since onset. Exacerbated by: diet. The symptoms are relieved by medications (diet). Review of Systems   Constitutional: Negative. HENT:        Gets a little runny nose at night when using his oxygen   Respiratory: Positive for cough and sputum production. Getting over a resp infection. Cardiovascular: Negative. Physical Exam   Constitutional: He is oriented to person, place, and time. He appears well-developed and well-nourished. No distress. HENT:   Right Ear: External ear normal.   Left Ear: External ear normal.   Mouth/Throat: Oropharynx is clear and moist. No oropharyngeal exudate. Cardiovascular: Normal rate and regular rhythm. Pulmonary/Chest: Effort normal and breath sounds normal. No respiratory distress.  He has no wheezes. He has no rales. Musculoskeletal: He exhibits edema (trace swelling.). Neurological: He is alert and oriented to person, place, and time. Skin: Skin is warm and dry. He is not diaphoretic. Psychiatric: He has a normal mood and affect. Nursing note and vitals reviewed. ASSESSMENT and PLAN    ICD-10-CM ICD-9-CM    1. Controlled type 2 diabetes mellitus without complication, without long-term current use of insulin ([facility]) D81.0 262.09 METABOLIC PANEL, COMPREHENSIVE      HEMOGLOBIN A1C W/O EAG      MICROALBUMIN, UR, RAND W/ MICROALBUMIN/CREA RATIO   2. Hyperlipidemia, unspecified hyperlipidemia type E78.5 272.4 LIPID PANEL   3. Chronic obstructive pulmonary disease, unspecified COPD type ([facility] J44.9 496 guaiFENesin-codeine (ROBITUSSIN AC) 100-10 mg/5 mL solution   4. Cough R05 786.2 guaiFENesin-codeine (ROBITUSSIN AC) 100-10 mg/5 mL solution   5. Primary osteoarthritis of both knees M17.0 715.16 AMB SUPPLY ORDER     Available hospital record reviewed--scant info via [address] regarding his XRT    BP looks good    Last BS has been good  Update lab    RX Robitussin AC for the cough and congestion. Do not feel he needs an antibiotic as the phlegm is clearing and he is not febrile and not having any respiratory distress. Continue other meds    Discussed BMI/weight, lifestyle, diet and exercise. Discussed effect on blood pressure, blood sugar, and joints especially  Focus on limiting white carbs, portion control, and moving more. But given his health it is unreasonable for him to get to a normal weight. O2, walker, lung disease.     F/u 4 months for MWV, HTN, DM

## 2018-02-05 NOTE — PROGRESS NOTES
[address] presents today for   Chief Complaint   Patient presents with    Hypertension    Diabetes       Basim Luevano preferred language for health care discussion is english/other. Is someone accompanying this pt? no    Is the patient using any DME equipment during [address]? Yes rollator    Depression Screening:  PHQ over the last two weeks 9/19/2017 7/25/2017 6/8/2017 5/3/2017 2/8/2017 6/26/2015 8/5/2014   Little interest or pleasure in doing things Not at all Not at all Not at all Not at all Not at all Not at all Not at all   Feeling down, depressed or hopeless Not at all Not at all Not at all Not at all Not at all Not at all Not at all   Total Score PHQ 2 0 0 0 0 0 0 0       Learning Assessment:  Learning Assessment 8/5/2014   PRIMARY LEARNER Patient   HIGHEST LEVEL OF EDUCATION - PRIMARY LEARNER  GRADUATED Alexi Dunlap 95 PRIMARY LEARNER NONE   CO-LEARNER CAREGIVER No   PRIMARY LANGUAGE ENGLISH   LEARNER PREFERENCE PRIMARY DEMONSTRATION     LISTENING     READING   ANSWERED BY patient   RELATIONSHIP SELF       Abuse Screening:  Abuse Screening Questionnaire 9/19/2017 3/16/2016 2/18/2016 1/21/2016 11/27/2015 10/30/2015 10/9/2015   Do you ever feel afraid of your partner? N N N N N N N   Are you in a relationship with someone who physically or mentally threatens you? N N N N N N N   Is it safe for you to go home? River PEARCE       Fall Risk  Fall Risk Assessment, last 12 mths 9/19/2017 7/25/2017 6/8/2017 5/3/2017 2/8/2017 6/26/2015 8/5/2014   Able to walk? Yes Yes Yes Yes Yes Yes Yes   Fall in past 12 months? No No No No No No No       Health Maintenance reviewed and discussed per provider. Yes    No hm due at this time      Advance Directive:  1. Do you have an advance directive in place? Patient Reply: no    2. If not, would you like material regarding how to put one in place? Patient Reply: no    Coordination of Care:  1.  Have you been to the ER, urgent care clinic since your last visit? Hospitalized since your last visit? no    2. Have you seen or consulted any other health care providers outside of the [address] [place] [address] since your last visit? Include any pap smears or colon screening.  no

## 2018-02-05 NOTE — MR AVS SNAPSHOT
[address] [place] [address] 
 
 
 [address] [id number] 
[phone] Patient: Danika Wells MRN: [id number] BKW:[birth year] Visit Information Date & Time Provider Department Dept. Phone Encounter #  
 2/5/2018  3:30 PM [address] [phone] [id number] Follow-up Instructions Return in about 14 weeks (around 5/14/2018) for Medicare Wellness Visit, HTN, DM, cholesterol. Your Appointments 2/5/2018  3:30 PM  
Office Visit with MD [facility] [address] [facility] Appt Note: 4 month f/u combo HTN and DM and chol; 4 month f/u combo HTN and DM and chol  
 [address]  
  
   
 [address] [facility] Upcoming Health Maintenance Date Due  
 FOOT EXAM Q1 2/8/2018 MICROALBUMIN Q1 2/8/2018 DTaP/Tdap/Td series (1 - Tdap) 4/18/2018* HEMOGLOBIN A1C Q6M 3/19/2018 MEDICARE YEARLY EXAM 5/4/2018 EYE EXAM RETINAL OR DILATED Q1 5/17/2018 LIPID PANEL Q1 9/19/2018 GLAUCOMA SCREENING Q2Y 5/17/2019 COLONOSCOPY 6/26/2025 *Topic was postponed. The date shown is not the original due date. Allergies as of 2/5/2018  Review Complete On: 2/5/2018 By: Nikki Mccord MD  
  
 Severity Noted Reaction Type Reactions Ace Inhibitors  10/09/2015    Cough Current Immunizations  Reviewed on 5/3/2017 Name Date Influenza High Dose Vaccine PF 9/19/2017 12:53 PM, 2/8/2017  5:06 PM  
 Influenza Vaccine 10/10/2015 12:00 AM, 10/31/2014, 10/3/2013 12:00 AM, 12/13/2012 12:00 AM, 10/21/2011 12:00 AM, 2/4/2010 12:00 AM  
 Novel Influenza-H1N1-09, All Formulations 3/2/2010 12:00 AM  
 Pneumococcal Conjugate (PCV-13) 12/17/2015 12:00 AM  
 Pneumococcal Polysaccharide (PPSV-23) 5/3/2017  6:02 PM, 10/21/2011 12:00 AM  
  
 Not reviewed this visit You Were Diagnosed With   
 Codes Comments Controlled type 2 diabetes mellitus without complication, without long-term current use of insulin ([facility]    -  Primary ICD-10-CM: E11.9 ICD-9-CM: 250.00 Hyperlipidemia, unspecified hyperlipidemia type     ICD-10-CM: E78.5 ICD-9-CM: 272.4 Chronic obstructive pulmonary disease, unspecified COPD type ([facility]     ICD-10-CM: J44.9 ICD-9-CM: 997 Cough     ICD-10-CM: R05 ICD-9-CM: 786.2 Primary osteoarthritis of both knees     ICD-10-CM: M17.0 ICD-9-CM: 715.16 Vitals BP Pulse Temp Resp Height(growth percentile) Weight(growth percentile) 131/78 93 98.5 °F (36.9 °C) (Oral) 15 5' 8\" (1.727 m) 192 lb (87.1 kg) SpO2 BMI Smoking Status 93% 29.19 kg/m2 Former Smoker BMI and BSA Data Body Mass Index Body Surface Area  
 29.19 kg/m 2 2.04 m 2 Preferred Pharmacy Pharmacy Name Phone [address]  [phone] Your Updated Medication List  
  
   
This list is accurate as of: 2/5/18  3:25 PM.  Always use your most recent med list.  
  
  
  
  
 * albuterol 2.5 mg /3 mL (0.083 %) nebulizer solution Commonly known as:  PROVENTIL VENTOLIN  
3 mL by Nebulization route every four (4) hours as needed for Wheezing. Dispense 100 unit dose vials * albuterol 90 mcg/actuation inhaler Commonly known as:  VENTOLIN HFA  
USE 2 PUFFS EVERY FOUR HOURS  
  
 albuterol-ipratropium 2.5 mg-0.5 mg/3 ml Nebu Commonly known as:  DUO-NEB  
  
 alcohol swabs Padm Commonly known as:  BD Single Use Swabs Regular Use as directed to test blood sugar  
  
 aspirin, buffered 81 mg Tab Take  by mouth. Blood Glucose Control High&Low Soln Commonly known as:  ACCU-CHEK BRIAN CONTROL SOLN  
use to test meter with each new box of strips and as needed Blood-Glucose Meter Misc Commonly known as:  ACCU-CHEK BRIAN PLUS METER Use to test blood sugar daily or as directed bumetanide 1 mg tablet Commonly known as:  Munira Dryer Take one tablet weekly  
  
 clotrimazole-betamethasone topical cream  
Commonly known as:  Robles Osuna Apply to affected area BID for 2 weeks. fluticasone-salmeterol 500-50 mcg/dose diskus inhaler Commonly known as:  ADVAIR DISKUS  
TAKE 1 PUFF BY INHALATION EVERY TWELVE (12) HOURS.  
  
 glucose blood VI test strips strip Commonly known as:  ACCU-CHEK BRIAN PLUS TEST STRP Use to test blood sugar daily  
  
 guaiFENesin-codeine 100-10 mg/5 mL solution Commonly known as:  ROBITUSSIN AC Take 5 mL by mouth three (3) times daily as needed for Cough. Max Daily Amount: 15 mL. Lancets Misc Commonly known as:  ACCU-CHEK SOFTCLIX LANCETS Use to test blood sugar daily  
  
 losartan 50 mg tablet Commonly known as:  COZAAR Take 1 Tab by mouth daily. metFORMIN 500 mg tablet Commonly known as:  GLUCOPHAGE Take 1 Tab by mouth two (2) times daily (with meals). metoprolol succinate 25 mg XL tablet Commonly known as:  TOPROL-XL Take 1 tablet by mouth daily. pantoprazole 40 mg tablet Commonly known as:  PROTONIX  
TAKE 1 TABLET BY MOUTH DAILY. potassium chloride SR 10 mEq tablet Commonly known as:  KLOR-CON 10 Take 20 mEq by mouth two (2) times a day. simvastatin 20 mg tablet Commonly known as:  ZOCOR  
TAKE 1 TABLET BY MOUTH EVERY EVENING  
  
 SPIRIVA WITH HANDIHALER 18 mcg inhalation capsule Generic drug:  tiotropium USE 1 CAPSULE VIA HANDIHALER ONCE A DAY FOR INHALATION  
  
 * Notice: This list has 2 medication(s) that are the same as other medications prescribed for you. Read the directions carefully, and ask your doctor or other care provider to review them with you. Prescriptions Printed Refills  
 guaiFENesin-codeine (ROBITUSSIN AC) 100-10 mg/5 mL solution 0 Sig: Take 5 mL by mouth three (3) times daily as needed for Cough. Max Daily Amount: 15 mL. Class: Print Route: Oral  
  
We Performed the Following AMB SUPPLY ORDER [[id number] Custom] Comments:  
 Raised toilet seat. Follow-up Instructions Return in about 14 weeks (around 5/14/2018) for Medicare Wellness Visit, HTN, DM, cholesterol. To-Do List   
 02/05/2018 Lab:  HEMOGLOBIN A1C W/O EAG   
  
 02/05/2018 Lab:  LIPID PANEL   
  
 02/05/2018 Lab:  METABOLIC PANEL, COMPREHENSIVE   
  
 02/05/2018 Lab:  MICROALBUMIN, UR, RAND W/ MICROALBUMIN/CREA RATIO Introducing [facility]! [facility] introduces [employer] patient portal. Now you can access parts of your medical record, email your doctor's office, and request medication refills online. 1. In your internet browser, go to [email or web address] 2. Click on the First Time User? Click Here link in the Sign In box. You will see the New Member Sign Up page. 3. Enter your [employer] Access Code exactly as it appears below. You will not need to use this code after youve completed the sign-up process. If you do not sign up before the expiration date, you must request a new code. · [employer] Access Code: [email or web address] Expires: 5/6/2018  3:25 PM 
 
4. Enter the last four digits of your Social Security Number (xxxx) and Date of Birth (mm/dd/yyyy) as indicated and click Submit. You will be taken to the next sign-up page. 5. Create a [employer] ID. This will be your [employer] login ID and cannot be changed, so think of one that is secure and easy to remember. 6. Create a [employer] password. You can change your password at any time. 7. Enter your Password Reset Question and Answer. This can be used at a later time if you forget your password. 8. Enter your e-mail address. You will receive e-mail notification when new information is available in [address] 9. Click Sign Up. You can now view and download portions of your medical record.  
10. Click the Download Summary menu link to download a portable copy of your medical information. If you have questions, please visit the Frequently Asked Questions section of the [employer] website. Remember, [employer] is NOT to be used for urgent needs. For medical emergencies, dial 911. Now available from your iPhone and Android! Please provide this summary of care documentation to your next provider. Your primary care clinician is listed as [facility]. If you have any questions after today's visit, please call [phone].

## 2018-02-06 LAB
ALBUMIN SERPL-MCNC: 4.1 G/DL (ref 3.5–4.8)
ALBUMIN/CREAT UR: 31 MG/G CREAT (ref 0–30)
ALBUMIN/GLOB SERPL: 1.5 {RATIO} (ref 1.2–2.2)
ALP SERPL-CCNC: 92 IU/L (ref 39–117)
ALT SERPL-CCNC: 20 IU/L (ref 0–44)
AST SERPL-CCNC: 22 IU/L (ref 0–40)
BILIRUB SERPL-MCNC: 0.2 MG/DL (ref 0–1.2)
BUN SERPL-MCNC: 9 MG/DL (ref 8–27)
BUN/CREAT SERPL: 15 (ref 10–24)
CALCIUM SERPL-MCNC: 9 MG/DL (ref 8.6–10.2)
CHLORIDE SERPL-SCNC: 101 MMOL/L (ref 96–106)
CHOLEST SERPL-MCNC: 148 MG/DL (ref 100–199)
CO2 SERPL-SCNC: 27 MMOL/L (ref 18–29)
CREAT SERPL-MCNC: 0.59 MG/DL (ref 0.76–1.27)
CREAT UR-MCNC: 120.3 MG/DL
GFR SERPLBLD CREATININE-BSD FMLA CKD-EPI: 103 ML/MIN/1.73
GFR SERPLBLD CREATININE-BSD FMLA CKD-EPI: 119 ML/MIN/1.73
GLOBULIN SER CALC-MCNC: 2.8 G/DL (ref 1.5–4.5)
GLUCOSE SERPL-MCNC: 115 MG/DL (ref 65–99)
HBA1C MFR BLD: 6.6 % (ref 4.8–5.6)
HDLC SERPL-MCNC: 45 MG/DL
INTERPRETATION, 910389: NORMAL
LDLC SERPL CALC-MCNC: 83 MG/DL (ref 0–99)
MICROALBUMIN UR-MCNC: 37.3 UG/ML
POTASSIUM SERPL-SCNC: 4.3 MMOL/L (ref 3.5–5.2)
PROT SERPL-MCNC: 6.9 G/DL (ref 6–8.5)
SODIUM SERPL-SCNC: 144 MMOL/L (ref 134–144)
TRIGL SERPL-MCNC: 99 MG/DL (ref 0–149)
VLDLC SERPL CALC-MCNC: 20 MG/DL (ref 5–40)

## 2018-04-10 DIAGNOSIS — R05.9 COUGH: ICD-10-CM

## 2018-04-10 DIAGNOSIS — J44.9 CHRONIC OBSTRUCTIVE PULMONARY DISEASE, UNSPECIFIED COPD TYPE (HCC): Chronic | ICD-10-CM

## 2018-05-03 DIAGNOSIS — E11.9 CONTROLLED TYPE 2 DIABETES MELLITUS WITHOUT COMPLICATION, WITHOUT LONG-TERM CURRENT USE OF INSULIN (HCC): Chronic | ICD-10-CM

## 2018-05-03 RX ORDER — ISOPROPYL ALCOHOL 70 ML/100ML
SWAB TOPICAL
Qty: 100 PAD | Refills: 5 | Status: SHIPPED | OUTPATIENT
Start: 2018-05-03 | End: 2018-05-05 | Stop reason: SDUPTHER

## 2018-05-05 DIAGNOSIS — E11.9 CONTROLLED TYPE 2 DIABETES MELLITUS WITHOUT COMPLICATION, WITHOUT LONG-TERM CURRENT USE OF INSULIN (HCC): Chronic | ICD-10-CM

## 2018-05-05 RX ORDER — LANCETS
EACH MISCELLANEOUS
Qty: 100 EACH | Refills: 5 | Status: SHIPPED | OUTPATIENT
Start: 2018-05-05 | End: 2018-05-08

## 2018-05-05 RX ORDER — BLOOD-GLUCOSE METER
EACH MISCELLANEOUS
Qty: 1 EACH | Status: SHIPPED | OUTPATIENT
Start: 2018-05-05 | End: 2018-05-08 | Stop reason: ALTCHOICE

## 2018-05-05 RX ORDER — ISOPROPYL ALCOHOL 70 ML/100ML
SWAB TOPICAL
Qty: 100 PAD | Refills: 5 | Status: SHIPPED | OUTPATIENT
Start: 2018-05-05 | End: 2021-05-18 | Stop reason: SDUPTHER

## 2018-05-08 DIAGNOSIS — E11.9 CONTROLLED TYPE 2 DIABETES MELLITUS WITHOUT COMPLICATION, WITHOUT LONG-TERM CURRENT USE OF INSULIN (HCC): Primary | Chronic | ICD-10-CM

## 2018-05-08 RX ORDER — BLOOD-GLUCOSE METER
EACH MISCELLANEOUS
Qty: 1 EACH | Status: SHIPPED | OUTPATIENT
Start: 2018-05-08 | End: 2021-05-18 | Stop reason: CLARIF

## 2018-05-08 RX ORDER — LANCETS
EACH MISCELLANEOUS
Qty: 100 EACH | Refills: 4 | Status: SHIPPED | OUTPATIENT
Start: 2018-05-08

## 2018-05-08 RX ORDER — INSULIN PUMP SYRINGE, 3 ML
EACH MISCELLANEOUS
Qty: 2 ML | Status: SHIPPED | OUTPATIENT
Start: 2018-05-08

## 2018-05-31 RX ORDER — IPRATROPIUM BROMIDE AND ALBUTEROL SULFATE 2.5; .5 MG/3ML; MG/3ML
3 SOLUTION RESPIRATORY (INHALATION)
Qty: 30 NEBULE | Refills: 5 | Status: SHIPPED | OUTPATIENT
Start: 2018-05-31 | End: 2019-01-10 | Stop reason: SDUPTHER

## 2018-05-31 NOTE — TELEPHONE ENCOUNTER
Requested Prescriptions     Pending Prescriptions Disp Refills    albuterol-ipratropium (DUO-NEB) 2.5 mg-0.5 mg/3 ml nebu 30 Nebule

## 2018-06-14 RX ORDER — LOSARTAN POTASSIUM 50 MG/1
50 TABLET ORAL DAILY
Qty: 90 TAB | Refills: 3 | Status: SHIPPED | OUTPATIENT
Start: 2018-06-14 | End: 2019-06-05 | Stop reason: SDUPTHER

## 2018-06-14 RX ORDER — PANTOPRAZOLE SODIUM 40 MG/1
TABLET, DELAYED RELEASE ORAL
Qty: 90 TAB | Refills: 3 | Status: SHIPPED | OUTPATIENT
Start: 2018-06-14 | End: 2019-05-23 | Stop reason: SDUPTHER

## 2018-06-14 NOTE — TELEPHONE ENCOUNTER
Patient request, last OV 2/5/18, next scheduled 6/18/18    Requested Prescriptions     Pending Prescriptions Disp Refills    pantoprazole (PROTONIX) 40 mg tablet 90 Tab 2     Sig: TAKE 1 TABLET BY MOUTH DAILY.  losartan (COZAAR) 50 mg tablet 90 Tab 3     Sig: Take 1 Tab by mouth daily.

## 2018-06-18 ENCOUNTER — OFFICE VISIT (OUTPATIENT)
Dept: INTERNAL MEDICINE CLINIC | Age: 73
End: 2018-06-18

## 2018-06-18 ENCOUNTER — HOSPITAL ENCOUNTER (OUTPATIENT)
Dept: LAB | Age: 73
Discharge: HOME | End: 2018-06-18

## 2018-06-18 VITALS
HEART RATE: 100 BPM | RESPIRATION RATE: 22 BRPM | HEIGHT: 68 IN | OXYGEN SATURATION: 93 % | SYSTOLIC BLOOD PRESSURE: 115 MMHG | DIASTOLIC BLOOD PRESSURE: 70 MMHG | WEIGHT: 198.2 LBS | TEMPERATURE: 97.9 F | BODY MASS INDEX: 30.04 KG/M2

## 2018-06-18 DIAGNOSIS — J44.9 CHRONIC OBSTRUCTIVE PULMONARY DISEASE, UNSPECIFIED COPD TYPE (HCC): Chronic | ICD-10-CM

## 2018-06-18 DIAGNOSIS — E78.5 HYPERLIPIDEMIA, UNSPECIFIED HYPERLIPIDEMIA TYPE: Chronic | ICD-10-CM

## 2018-06-18 DIAGNOSIS — B35.1 ONYCHOMYCOSIS: ICD-10-CM

## 2018-06-18 DIAGNOSIS — Z00.00 MEDICARE ANNUAL WELLNESS VISIT, SUBSEQUENT: Primary | ICD-10-CM

## 2018-06-18 DIAGNOSIS — E11.9 CONTROLLED TYPE 2 DIABETES MELLITUS WITHOUT COMPLICATION, WITHOUT LONG-TERM CURRENT USE OF INSULIN (HCC): Chronic | ICD-10-CM

## 2018-06-18 PROBLEM — E11.21 TYPE 2 DIABETES WITH NEPHROPATHY (HCC): Status: ACTIVE | Noted: 2018-06-18

## 2018-06-18 PROCEDURE — 99001 SPECIMEN HANDLING PT-LAB: CPT | Performed by: INTERNAL MEDICINE

## 2018-06-18 RX ORDER — AZITHROMYCIN 250 MG/1
TABLET, FILM COATED ORAL
Qty: 6 TAB | Refills: 0 | Status: SHIPPED | OUTPATIENT
Start: 2018-06-18 | End: 2019-03-04

## 2018-06-18 NOTE — PROGRESS NOTES
This is the Subsequent Medicare Annual Wellness Exam, performed 12 months or more after the Initial AWV or the last Subsequent AWV    I have reviewed the patient's medical history in detail and updated the computerized patient record. History     Past Medical History:   Diagnosis Date    Bunion of great toe of left foot     Bunion of great toe of right foot     CAD (coronary artery disease) 8/5/2014    Chronic obstructive pulmonary disease ([facility] 2006    CTS (carpal tunnel syndrome)     left    Diabetes ([facility] 2006    GERD (gastroesophageal reflux disease) 8/5/2014    Hammertoe     Hyperlipidemia 8/5/2014    Hypertension     Mobility impaired     Oxygen dependent     Pulmonary nodules 03/24/2017    sees Dr. Norris Meza SCC (squamous cell carcinoma of lung) ([facility] 08/22/2017    left apex    Stasis dermatitis       Past Surgical History:   Procedure Laterality Date    HX CORONARY ARTERY BYPASS GRAFT  8/6/2006    4 way bipass     Current Outpatient Prescriptions   Medication Sig Dispense Refill    pantoprazole (PROTONIX) 40 mg tablet TAKE 1 TABLET BY MOUTH DAILY. 90 Tab 3    losartan (COZAAR) 50 mg tablet Take 1 Tab by mouth daily. 90 Tab 3    albuterol-ipratropium (DUO-NEB) 2.5 mg-0.5 mg/3 ml nebu 3 mL by Nebulization route every six (6) hours as needed.  30 Nebule 5    Blood-Glucose Meter (ACCU-CHEK SHERRI) misc Use to test blood sugar daily or as directed 1 Each prn    glucose blood VI test strips (ACCU-CHEK SMARTVIEW TEST STRIP) strip Use to test blood sugar daily 100 Strip 4    Blood Glucose Control, Normal (ACCU-CHEK SMARTVIEW CONTRL SOL) soln Use to test meter with each new bottle of test strips 2 mL prn    Lancets (ACCU-CHEK FASTCLIX) misc Use to test blood sugar daily 100 Each 4    alcohol swabs (BD SINGLE USE SWABS REGULAR) padm Use as directed to test blood sugar 100 Pad 5    SPIRIVA WITH HANDIHALER 18 mcg inhalation capsule USE 1 CAPSULE VIA HANDIHALER ONCE A DAY FOR INHALATION 30 Cap 5    albuterol (VENTOLIN HFA) 90 mcg/actuation inhaler USE 2 PUFFS EVERY FOUR HOURS 3 Inhaler 11    fluticasone-salmeterol (ADVAIR DISKUS) 500-50 mcg/dose diskus inhaler TAKE 1 PUFF BY INHALATION EVERY TWELVE (12) HOURS. 3 Inhaler 11    simvastatin (ZOCOR) 20 mg tablet TAKE 1 TABLET BY MOUTH EVERY EVENING 90 Tab 4    metFORMIN (GLUCOPHAGE) 500 mg tablet Take 1 Tab by mouth two (2) times daily (with meals). 180 Tab 5    clotrimazole-betamethasone (LOTRISONE) topical cream Apply to affected area BID for 2 weeks. 30 g 11    albuterol (PROVENTIL VENTOLIN) 2.5 mg /3 mL (0.083 %) nebulizer solution 3 mL by Nebulization route every four (4) hours as needed for Wheezing. Dispense 100 unit dose vials 100 Each 3    metoprolol succinate (TOPROL-XL) 25 mg XL tablet Take 1 tablet by mouth daily. 90 tablet 3    Aspirin, Buffered 81 mg tab Take  by mouth.  CHERATUSSIN AC  mg/5 mL solution TAKE 1 TEASPOONFUL ( 5ML ) BY MOUTH THREE TIMES DAILY AS NEEDED FOR COUGH MAX DAILY AMOUNT= 15  mL 0    bumetanide (BUMEX) 1 mg tablet Take one tablet weekly 12 Tab 3    potassium chloride SR (KLOR-CON 10) 10 mEq tablet Take 20 mEq by mouth two (2) times a day.        Allergies   Allergen Reactions    Ace Inhibitors Cough     Family History   Problem Relation Age of Onset    Heart Disease Father     Cancer Father     Diabetes Brother     Diabetes Maternal Grandmother     No Known Problems Mother     Hypertension Paternal Grandmother      Social History   Substance Use Topics    Smoking status: Former Smoker     Years: 56.00     Types: Cigarettes     Quit date: 8/6/2006    Smokeless tobacco: Never Used    Alcohol use No      Comment: rare     Patient Active Problem List   Diagnosis Code    COPD (chronic obstructive pulmonary disease) ([facility] J44.9    GERD (gastroesophageal reflux disease) K21.9    CAD (coronary artery disease) I25.10    Diabetes mellitus type 2, controlled, without complications ([facility] E11.9    Hyperlipidemia E78.5    Chronic low back pain M54.5, G89.29    Oxygen dependent Z99.81    Advanced care planning/counseling discussion Z71.89    Type 2 diabetes with nephropathy (HCC) E11.21       Depression Risk Factor Screening:     PHQ over the last two weeks 6/18/2018   Little interest or pleasure in doing things Not at all   Feeling down, depressed or hopeless Not at all   Total Score PHQ 2 0     Alcohol Risk Factor Screening: You do not drink alcohol or very rarely. Functional Ability and Level of Safety:   Hearing Loss  Hearing is good. Activities of Daily Living  The home contains: grab bars  Patient needs help with:  transportation and walking. Wears oxygen, uses walker    Fall Risk  Fall Risk Assessment, last 12 mths 6/18/2018   Able to walk? Yes   Fall in past 12 months? No       Abuse Screen  Patient is not abused    Cognitive Screening   Evaluation of Cognitive Function:  Has your family/caregiver stated any concerns about your memory: no  Normal, Mini Cog test    Patient Care Team   Patient Care Team:  Guevara Beaulieu MD as PCP - General (Internal Medicine)  Teofilo Bradford MD (Gastroenterology)  Alicia Eldridge RN as Ambulatory Care Navigator  All Carney MD as Consulting Provider (Cardiology)  Renetta Browne MD as Consulting Provider (Pulmonary Disease)  Ady Vicente MD as Consulting Provider (Optometry)  Shirley Guzman DPM as Consulting Provider (Podiatry)  Matthieu Way MD (Pulmonary Disease)    Assessment/Plan   Education and counseling provided:  Are appropriate based on today's review and evaluation    Diagnoses and all orders for this visit:    1.  Medicare annual wellness visit, subsequent      Health Maintenance Due   Topic Date Due    DTaP/Tdap/Td series (1 - Tdap) 06/11/1966    FOOT EXAM Q1  02/08/2018    MEDICARE YEARLY EXAM  05/04/2018    EYE EXAM RETINAL OR DILATED Q1  05/17/2018

## 2018-06-18 NOTE — MR AVS SNAPSHOT
Nikita Onofre 
 
 
 [address] [place] [address] [address] 
[phone] Patient: Tri Reddy MRN: [id number] MSA:[id number] Visit Information Date & Time Provider Department Dept. Phone Encounter #  
 6/18/2018  3:30 PM Liliane Rivera [facility] [phone] [id number] Follow-up Instructions Return in about 4 months (around 10/18/2018) for HTN, DM, cholesterol. Your Appointments 6/18/2018  3:30 PM  
Medicare Physical with Liliane Rivera MD  
[facility] Appt Note: 30 min annual [address] htn and dm; 0 min annual [address] htn and dm; not a working number 06/16/2018   
 [address] [facility]  
  
   
 [address] [facility] Upcoming Health Maintenance Date Due  
 FOOT EXAM Q1 2/8/2018 MEDICARE YEARLY EXAM 5/4/2018 EYE EXAM RETINAL OR DILATED Q1 5/17/2018 DTaP/Tdap/Td series (1 - Tdap) 6/18/2019* Influenza Age 5 to Adult 8/1/2018 HEMOGLOBIN A1C Q6M 8/5/2018 MICROALBUMIN Q1 2/5/2019 LIPID PANEL Q1 2/5/2019 GLAUCOMA SCREENING Q2Y 5/17/2019 COLONOSCOPY 6/26/2025 *Topic was postponed. The date shown is not the original due date. Allergies as of 6/18/2018  Review Complete On: 6/18/2018 By: Liliane Rivera MD  
  
 Severity Noted Reaction Type Reactions Ace Inhibitors  10/09/2015    Cough Current Immunizations  Reviewed on 5/3/2017 Name Date  Influenza High Dose Vaccine PF 9/19/2017 12:53 PM, 2/8/2017  5:06 PM  
 Influenza Vaccine 10/10/2015 12:00 AM, 10/31/2014, 10/3/2013 12:00 AM, 12/13/2012 12:00 AM, 10/21/2011 12:00 AM, 2/4/2010 12:00 AM  
 Novel Influenza-H1N1-09, All Formulations 3/2/2010 12:00 AM  
 Pneumococcal Conjugate (PCV-13) 12/17/2015 12:00 AM  
 Pneumococcal Polysaccharide (PPSV-23) 5/3/2017  6:02 PM, 10/21/2011 12:00 AM  
  
 Not reviewed this visit You Were Diagnosed With   
  
 Codes Comments Medicare annual wellness visit, subsequent    -  Primary ICD-10-CM: Z00.00 ICD-9-CM: V70.0 Chronic obstructive pulmonary disease, unspecified COPD type ([facility]     ICD-10-CM: J44.9 ICD-9-CM: 607 Controlled type 2 diabetes mellitus without complication, without long-term current use of insulin ([facility]     ICD-10-CM: E11.9 ICD-9-CM: 250.00 Hyperlipidemia, unspecified hyperlipidemia type     ICD-10-CM: E78.5 ICD-9-CM: 272.4 Onychomycosis     ICD-10-CM: B35.1 ICD-9-CM: 110.1 Vitals BP Pulse Temp Resp Height(growth percentile) Weight(growth percentile) 115/70 (BP 1 Location: Left arm, BP Patient Position: Sitting) 100 97.9 °F (36.6 °C) (Oral) 22 5' 8\" (1.727 m) 198 lb 3.2 oz (89.9 kg) SpO2 BMI Smoking Status 93% 30.14 kg/m2 Former Smoker Vitals History BMI and BSA Data Body Mass Index Body Surface Area  
 30.14 kg/m 2 2.08 m 2 Preferred Pharmacy Pharmacy Name Phone [address]  [phone] Your Updated Medication List  
  
   
This list is accurate as of 6/18/18  3:08 PM.  Always use your most recent med list.  
  
  
  
  
 * albuterol 2.5 mg /3 mL (0.083 %) nebulizer solution Commonly known as:  PROVENTIL VENTOLIN  
3 mL by Nebulization route every four (4) hours as needed for Wheezing. Dispense 100 unit dose vials * albuterol 90 mcg/actuation inhaler Commonly known as:  VENTOLIN HFA  
USE 2 PUFFS EVERY FOUR HOURS  
  
 albuterol-ipratropium 2.5 mg-0.5 mg/3 ml Nebu Commonly known as:  DUO-NEB  
3 mL by Nebulization route every six (6) hours as needed. alcohol swabs Padm Commonly known as:  BD Single Use Swabs Regular Use as directed to test blood sugar  
  
 aspirin, buffered 81 mg Tab Take  by mouth. azithromycin 250 mg tablet Commonly known as:  Micaela San Take two tablets today then one tablet daily Blood Glucose Control, Normal Soln Commonly known as:  ACCU-CHEK SMARTVIEW CONTRL SOL Use to test meter with each new bottle of test strips Blood-Glucose Meter Misc Commonly known as:  Marcos Cast Use to test blood sugar daily or as directed  
  
 bumetanide 1 mg tablet Commonly known as:  Lazarus [place] Take one tablet weekly CHERATUSSIN -10 mg/5 mL solution Generic drug:  guaiFENesin-codeine TAKE 1 TEASPOONFUL ( 5ML ) BY MOUTH THREE TIMES DAILY AS NEEDED FOR COUGH MAX DAILY AMOUNT= 15 ML  
  
 clotrimazole-betamethasone topical cream  
Commonly known as:  Severa Sergey Apply to affected area BID for 2 weeks. fluticasone-salmeterol 500-50 mcg/dose diskus inhaler Commonly known as:  ADVAIR DISKUS  
TAKE 1 PUFF BY INHALATION EVERY TWELVE (12) HOURS.  
  
 glucose blood VI test strips strip Commonly known as:  [address] Use to test blood sugar daily Lancets Misc Commonly known as:  ACCU-CHEK FASTCLIX Use to test blood sugar daily  
  
 losartan 50 mg tablet Commonly known as:  COZAAR Take 1 Tab by mouth daily. metFORMIN 500 mg tablet Commonly known as:  GLUCOPHAGE Take 1 Tab by mouth two (2) times daily (with meals). metoprolol succinate 25 mg XL tablet Commonly known as:  TOPROL-XL Take 1 tablet by mouth daily. pantoprazole 40 mg tablet Commonly known as:  PROTONIX  
TAKE 1 TABLET BY MOUTH DAILY. potassium chloride SR 10 mEq tablet Commonly known as:  KLOR-CON 10 Take 20 mEq by mouth two (2) times a day. simvastatin 20 mg tablet Commonly known as:  ZOCOR  
TAKE 1 TABLET BY MOUTH EVERY EVENING  
  
 SPIRIVA WITH HANDIHALER 18 mcg inhalation capsule Generic drug:  tiotropium USE 1 CAPSULE VIA HANDIHALER ONCE A DAY FOR INHALATION  
  
 * Notice:   This list has 2 medication(s) that are the same as other medications prescribed for you. Read the directions carefully, and ask your doctor or other care provider to review them with you. Prescriptions Sent to Pharmacy Refills  
 azithromycin (ZITHROMAX) 250 mg tablet 0 Sig: Take two tablets today then one tablet daily Class: Normal  
 Pharmacy: [facility] [address] #: [phone] We Performed the Following REFERRAL TO PODIATRY [REF90 Custom] Comments:  
 Diabetic foot care. Follow-up Instructions Return in about 4 months (around 10/18/2018) for HTN, DM, cholesterol. To-Do List   
 06/18/2018 Lab:  HEMOGLOBIN A1C W/O EAG   
  
 06/18/2018 Lab:  LIPID PANEL   
  
 06/18/2018 Lab:  METABOLIC PANEL, COMPREHENSIVE Referral Information Referral ID Referred By Referred To  
  
 [id number] [facility] Not Available Visits Status Start Date End Date 1 New Request 6/18/18 6/18/19 If your referral has a status of pending review or denied, additional information will be sent to support the outcome of this decision. Patient Instructions Medicare Wellness Visit, Male The best way to live healthy is to have a lifestyle where you eat a well-balanced diet, exercise regularly, limit alcohol use, and quit all forms of tobacco/nicotine, if applicable. Regular preventive services are another way to keep healthy. Preventive services (vaccines, screening tests, monitoring & exams) can help personalize your care plan, which helps you manage your own care. Screening tests can find health problems at the earliest stages, when they are easiest to treat. [place]  follows the current, evidence-based guidelines published by the [facility] Marc Scout (USPSTF) when recommending preventive services for our patients.  Because we follow these guidelines, sometimes recommendations change over time as research supports it. (For example, a prostate screening blood test is no longer routinely recommended for men with no symptoms.) Of course, you and your provider may decide to screen more often for some diseases, based on your risk and co-morbidities (chronic disease you are already diagnosed with). Preventive services for you include: - Medicare offers their members a free annual wellness visit, which is time for you and your primary care provider to discuss and plan for your preventive service needs. Take advantage of this benefit every year! 
 
-All people over age 72 should receive the recommended pneumonia vaccines. Current USPSTF guidelines recommend a series of two vaccines for the best pneumonia protection.  
 
-All adults should have a yearly flu vaccine and a tetanus vaccine every 10 years. All adults age 61 years should receive a shingles vaccine once in their lifetime.   
 
-All adults age 38-68 years who are overweight should have a diabetes screening test once every three years.  
 
-Other screening tests & preventive services for persons with diabetes include: an eye exam to screen for diabetic retinopathy, a kidney function test, a foot exam, and stricter control over your cholesterol.  
 
-Cardiovascular screening for adults with routine risk involves an electrocardiogram (ECG) at intervals determined by the provider.  
 
-Colorectal cancer screenings should be done for adults age 54-65 years with normal risk. There are a number of acceptable methods of screening for this type of cancer. Each test has its own benefits and drawbacks. Discuss with your provider what is most appropriate for you during your annual wellness visit.  The different tests include: colonoscopy (considered the best screening method), a fecal occult blood test, a fecal DNA test, and sigmoidoscopy. 
 
-All adults born between [facility] should be screened once for Hepatitis C. 
 
 -An Abdominal Aortic Aneurysm (AAA) Screening is recommended for men age 73-68 who has ever smoked in their lifetime. Here is a list of your current Health Maintenance items (your personalized list of preventive services) with a due date: 
Health Maintenance Due Topic Date Due  
 DTaP/Tdap/Td  (1 - Tdap) 06/11/1966 [facility] Diabetic Foot Care  02/08/2018 [facility] Annual Well Visit  05/04/2018 [facility] Eye Exam  05/17/2018 Introducing [facility]! Manju Guzman introduces [employer] patient portal. Now you can access parts of your medical record, email your doctor's office, and request medication refills online. 1. In your internet browser, go to [email or web address] 2. Click on the First Time User? Click Here link in the Sign In box. You will see the New Member Sign Up page. 3. Enter your [employer] Access Code exactly as it appears below. You will not need to use this code after youve completed the sign-up process. If you do not sign up before the expiration date, you must request a new code. · [employer] Access Code: [email or web address] Expires: 9/16/2018  2:46 PM 
 
4. Enter the last four digits of your Social Security Number (xxxx) and Date of Birth (mm/dd/yyyy) as indicated and click Submit. You will be taken to the next sign-up page. 5. Create a [employer] ID. This will be your [employer] login ID and cannot be changed, so think of one that is secure and easy to remember. 6. Create a [employer] password. You can change your password at any time. 7. Enter your Password Reset Question and Answer. This can be used at a later time if you forget your password. 8. Enter your e-mail address. You will receive e-mail notification when new information is available in [address] 9. Click Sign Up. You can now view and download portions of your medical record. 10. Click the Download Summary menu link to download a portable copy of your medical information. If you have questions, please visit the Frequently Asked Questions section of the [employer] website. Remember, [employer] is NOT to be used for urgent needs. For medical emergencies, dial 911. Now available from your iPhone and Android! Please provide this summary of care documentation to your next provider. Your primary care clinician is listed as [facility]. If you have any questions after today's visit, please call [phone].

## 2018-06-18 NOTE — PATIENT INSTRUCTIONS
Medicare Wellness Visit, Male    The best way to live healthy is to have a lifestyle where you eat a well-balanced diet, exercise regularly, limit alcohol use, and quit all forms of tobacco/nicotine, if applicable. Regular preventive services are another way to keep healthy. Preventive services (vaccines, screening tests, monitoring & exams) can help personalize your care plan, which helps you manage your own care. Screening tests can find health problems at the earliest stages, when they are easiest to treat. [address] follows the current, evidence-based guidelines published by the [facility] Marc Scout ([facility]) when recommending preventive services for our patients. Because we follow these guidelines, sometimes recommendations change over time as research supports it. (For example, a prostate screening blood test is no longer routinely recommended for men with no symptoms.)    Of course, you and your provider may decide to screen more often for some diseases, based on your risk and co-morbidities (chronic disease you are already diagnosed with). Preventive services for you include:    - Medicare offers their members a free annual wellness visit, which is time for you and your primary care provider to discuss and plan for your preventive service needs. Take advantage of this benefit every year!    -All people over age 72 should receive the recommended pneumonia vaccines. Current USPSTF guidelines recommend a series of two vaccines for the best pneumonia protection.     -All adults should have a yearly flu vaccine and a tetanus vaccine every 10 years.  All adults age 61 years should receive a shingles vaccine once in their lifetime.      -All adults age 38-68 years who are overweight should have a diabetes screening test once every three years.     -Other screening tests & preventive services for persons with diabetes include: an eye exam to screen for diabetic retinopathy, a kidney function test, a foot exam, and stricter control over your cholesterol.     -Cardiovascular screening for adults with routine risk involves an electrocardiogram (ECG) at intervals determined by the provider.     -Colorectal cancer screenings should be done for adults age 54-65 years with normal risk. There are a number of acceptable methods of screening for this type of cancer. Each test has its own benefits and drawbacks. Discuss with your provider what is most appropriate for you during your annual wellness visit. The different tests include: colonoscopy (considered the best screening method), a fecal occult blood test, a fecal DNA test, and sigmoidoscopy.    -All adults born between [facility] should be screened once for Hepatitis C.    -An Abdominal Aortic Aneurysm (AAA) Screening is recommended for men age 73-68 who has ever smoked in their lifetime.      Here is a list of your current Health Maintenance items (your personalized list of preventive services) with a due date:  Health Maintenance Due   Topic Date Due    DTaP/Tdap/Td  (1 - Tdap) 06/11/1966    Diabetic Foot Care  02/08/2018    Annual Well Visit  05/04/2018    Eye Exam  05/17/2018

## 2018-06-18 NOTE — PROGRESS NOTES
HISTORY OF PRESENT ILLNESS  Tin Marquez is a 72 y.o. male. Visit Vitals    /70 (BP 1 Location: Left arm, BP Patient Position: Sitting)    Pulse 100    Temp 97.9 °F (36.6 °C) (Oral)    Resp 22    Ht 5' 8\" (1.727 m)    Wt 198 lb 3.2 oz (89.9 kg)    SpO2 93%    BMI 30.14 kg/m2       HPI Comments: Also reports more phlegm recently. He is taking mucinex and robitussin and is still congested. It is slightly yellow, it is not thicker than usual  No fever. No sore throat    Pt has lung cancer. And is on home O2    Blood Pressure Check   Associated symptoms include shortness of breath. Diabetes   The history is provided by the patient. This is a chronic problem. The current episode started more than 1 week ago. The problem occurs daily. The problem has not changed since onset. Associated symptoms include shortness of breath. Exacerbated by: diet. The symptoms are relieved by medications (diet). Cholesterol Problem   The history is provided by the patient. This is a chronic problem. The current episode started more than 1 week ago. The problem occurs daily. The problem has not changed since onset. Associated symptoms include shortness of breath. Exacerbated by: diet. The symptoms are relieved by medications (diet). Review of Systems   Constitutional: Negative for chills and fever. Respiratory: Positive for cough, sputum production and shortness of breath. Cardiovascular: Negative. Gastrointestinal:        Reports good appetite. Physical Exam   Constitutional: He is oriented to person, place, and time. He appears well-developed and well-nourished. No distress. Cardiovascular: Normal rate and regular rhythm. Pulmonary/Chest: Effort normal.   Few scattered basilar rales. Musculoskeletal: He exhibits no edema. Neurological: He is alert and oriented to person, place, and time.    Diabetic foot exam:     Left Foot:   Visual Exam: severe bunions and overlapping toes   Pulse DP: 1+ (weak)   Filament test: normal sensation    Vibratory sensation: diminished      Right Foot:   Visual Exam: severe bunion and callouses   Pulse DP: 1+ (weak)   Filament test: normal sensation    Vibratory sensation: diminished     Skin: Skin is warm and dry. He is not diaphoretic. Psychiatric: He has a normal mood and affect. Nursing note and vitals reviewed. ASSESSMENT and PLAN    ICD-10-CM ICD-9-CM           2. Chronic obstructive pulmonary disease, unspecified COPD type ([facility]) J44.9 496 azithromycin (ZITHROMAX) 250 mg tablet   3. Controlled type 2 diabetes mellitus without complication, without long-term current use of insulin ([facility]) K92.1 169.52 METABOLIC PANEL, COMPREHENSIVE     diabetic foot exam      HEMOGLOBIN A1C W/O EAG      REFERRAL TO PODIATRY   4. Hyperlipidemia, unspecified hyperlipidemia type E78.5 272.4 LIPID PANEL   5. Onychomycosis B35.1 110.1 REFERRAL TO PODIATRY       Will add zithromax for the cough and phlegm  His life expectancy may be limited by his known lung cancer. Update lab    Refer to podiatry for diabetic foot care.     F/u 4 months

## 2018-06-18 NOTE — PROGRESS NOTES
ROOM # 6    Luigi Shin presents today for   Chief Complaint   Patient presents with    Annual Wellness Visit    Blood Pressure Check    Diabetes    Cholesterol Problem       Luigi Shin preferred language for health care discussion is english/other. Is someone accompanying this pt? no    Is the patient using any DME equipment during [address]? Yes, walker, O2    Depression Screening:  PHQ over the last two weeks 6/18/2018 9/19/2017 7/25/2017 6/8/2017 5/3/2017 2/8/2017 6/26/2015   Little interest or pleasure in doing things Not at all Not at all Not at all Not at all Not at all Not at all Not at all   Feeling down, depressed or hopeless Not at all Not at all Not at all Not at all Not at all Not at all Not at all   Total Score PHQ 2 0 0 0 0 0 0 0       Learning Assessment:  Learning Assessment 8/5/2014   PRIMARY LEARNER Patient   HIGHEST LEVEL OF EDUCATION - PRIMARY LEARNER  GRADUATED Alexi Dunlap [address] PRIMARY LEARNER NONE   CO-LEARNER CAREGIVER No   PRIMARY LANGUAGE ENGLISH   LEARNER PREFERENCE PRIMARY DEMONSTRATION     LISTENING     READING   ANSWERED BY patient   RELATIONSHIP SELF       Abuse Screening:  Abuse Screening Questionnaire 9/19/2017 3/16/2016 2/18/2016 1/21/2016 11/27/2015 10/30/2015 10/9/2015   Do you ever feel afraid of your partner? N N N N N N N   Are you in a relationship with someone who physically or mentally threatens you? N N N N N N N   Is it safe for you to go home?  Y Y Y Y Y Y Y       ADL Assessment:  ADL Assessment 5/3/2017   Feeding yourself No Help Needed   Getting from bed to chair No Help Needed   Getting dressed No Help Needed   Bathing or showering No Help Needed   Walk across the room (includes cane/walker) No Help Needed   Using the telphone No Help Needed   Taking your medications No Help Needed   Preparing meals No Help Needed   Managing money (expenses/bills) No Help Needed   Moderately strenuous housework (laundry) No Help Needed   Shopping for personal items (toiletries/medicines) No Help Needed   Shopping for groceries No Help Needed   Driving Help Needed   Climbing a flight of stairs Help Needed   Getting to places beyond walking distances Help Needed       Fall Risk:  Fall Risk Assessment, last 12 mths 6/18/2018 9/19/2017 7/25/2017 6/8/2017 5/3/2017 2/8/2017 6/26/2015   Able to walk? Yes Yes Yes Yes Yes Yes Yes   Fall in past 12 months? No No No No No No No       Health Maintenance reviewed and discussed per provider. Yes    Selwyn Tsang is due for   Health Maintenance Due   Topic Date Due    DTaP/Tdap/Td series (1 - Tdap) 06/11/1968    FOOT EXAM Q1  02/08/2018    MEDICARE YEARLY EXAM  05/04/2018    EYE EXAM RETINAL OR DILATED Q1  05/17/2018     Please order/place referral if appropriate. Advance Directive:  1. Do you have an advance directive in place? Patient Reply: no    2. If not, would you like material regarding how to put one in place? Patient Reply: no    Coordination of Care:  1. Have you been to the ER, urgent care clinic since your last visit? Hospitalized since your last visit? no    2. Have you seen or consulted any other health care providers outside of the [employer] [place] [address] [place] since your last visit? Include any pap smears or colon screening.  no

## 2018-06-19 LAB
ALBUMIN SERPL-MCNC: 4.4 G/DL (ref 3.5–4.8)
ALBUMIN/GLOB SERPL: 1.3 {RATIO} (ref 1.2–2.2)
ALP SERPL-CCNC: 94 IU/L (ref 39–117)
ALT SERPL-CCNC: 25 IU/L (ref 0–44)
AST SERPL-CCNC: 26 IU/L (ref 0–40)
BILIRUB SERPL-MCNC: 0.3 MG/DL (ref 0–1.2)
BUN SERPL-MCNC: 13 MG/DL (ref 8–27)
BUN/CREAT SERPL: 21 (ref 10–24)
CALCIUM SERPL-MCNC: 10 MG/DL (ref 8.6–10.2)
CHLORIDE SERPL-SCNC: 99 MMOL/L (ref 96–106)
CHOLEST SERPL-MCNC: 174 MG/DL (ref 100–199)
CO2 SERPL-SCNC: 27 MMOL/L (ref 20–29)
CREAT SERPL-MCNC: 0.62 MG/DL (ref 0.76–1.27)
GFR SERPLBLD CREATININE-BSD FMLA CKD-EPI: 100 ML/MIN/1.73
GFR SERPLBLD CREATININE-BSD FMLA CKD-EPI: 115 ML/MIN/1.73
GLOBULIN SER CALC-MCNC: 3.4 G/DL (ref 1.5–4.5)
GLUCOSE SERPL-MCNC: 85 MG/DL (ref 65–99)
HBA1C MFR BLD: 7.1 % (ref 4.8–5.6)
HDLC SERPL-MCNC: 49 MG/DL
INTERPRETATION, 910389: NORMAL
LDLC SERPL CALC-MCNC: 87 MG/DL (ref 0–99)
POTASSIUM SERPL-SCNC: 4.3 MMOL/L (ref 3.5–5.2)
PROT SERPL-MCNC: 7.8 G/DL (ref 6–8.5)
SODIUM SERPL-SCNC: 143 MMOL/L (ref 134–144)
TRIGL SERPL-MCNC: 191 MG/DL (ref 0–149)
VLDLC SERPL CALC-MCNC: 38 MG/DL (ref 5–40)

## 2018-08-09 RX ORDER — METFORMIN HYDROCHLORIDE 500 MG/1
500 TABLET ORAL 2 TIMES DAILY WITH MEALS
Qty: 180 TAB | Refills: 5 | Status: SHIPPED | OUTPATIENT
Start: 2018-08-09 | End: 2019-08-09 | Stop reason: SDUPTHER

## 2018-08-09 NOTE — TELEPHONE ENCOUNTER
Patient request, last OV 6/18/18, next scheduled 10/18/18    Requested Prescriptions     Pending Prescriptions Disp Refills    metFORMIN (GLUCOPHAGE) 500 mg tablet 180 Tab 5     Sig: Take 1 Tab by mouth two (2) times daily (with meals).

## 2018-08-31 RX ORDER — SIMVASTATIN 20 MG/1
TABLET, FILM COATED ORAL
Qty: 90 TAB | Refills: 4 | Status: SHIPPED | OUTPATIENT
Start: 2018-08-31 | End: 2019-11-30 | Stop reason: SDUPTHER

## 2018-09-28 DIAGNOSIS — Z76.0 MEDICATION REFILL: ICD-10-CM

## 2018-09-30 RX ORDER — FLUTICASONE PROPIONATE AND SALMETEROL 50; 500 UG/1; UG/1
POWDER RESPIRATORY (INHALATION)
Qty: 180 EACH | Refills: 11 | Status: SHIPPED | OUTPATIENT
Start: 2018-09-30 | End: 2020-05-14

## 2018-10-09 DIAGNOSIS — Z76.0 MEDICATION REFILL: ICD-10-CM

## 2018-10-09 RX ORDER — ALBUTEROL SULFATE 90 UG/1
AEROSOL, METERED RESPIRATORY (INHALATION)
Qty: 3 INHALER | Refills: 11 | Status: SHIPPED | OUTPATIENT
Start: 2018-10-09 | End: 2019-10-21 | Stop reason: SDUPTHER

## 2018-10-18 ENCOUNTER — OFFICE VISIT (OUTPATIENT)
Dept: INTERNAL MEDICINE CLINIC | Age: 73
End: 2018-10-18

## 2018-10-18 ENCOUNTER — HOSPITAL ENCOUNTER (OUTPATIENT)
Dept: LAB | Age: 73
Discharge: HOME | End: 2018-10-18

## 2018-10-18 VITALS
HEIGHT: 68 IN | TEMPERATURE: 98.7 F | HEART RATE: 95 BPM | WEIGHT: 195 LBS | BODY MASS INDEX: 29.55 KG/M2 | DIASTOLIC BLOOD PRESSURE: 65 MMHG | RESPIRATION RATE: 16 BRPM | OXYGEN SATURATION: 91 % | SYSTOLIC BLOOD PRESSURE: 111 MMHG

## 2018-10-18 DIAGNOSIS — E78.5 HYPERLIPIDEMIA, UNSPECIFIED HYPERLIPIDEMIA TYPE: ICD-10-CM

## 2018-10-18 DIAGNOSIS — J44.9 CHRONIC OBSTRUCTIVE PULMONARY DISEASE, UNSPECIFIED COPD TYPE (HCC): ICD-10-CM

## 2018-10-18 DIAGNOSIS — J06.9 UPPER RESPIRATORY TRACT INFECTION, UNSPECIFIED TYPE: ICD-10-CM

## 2018-10-18 DIAGNOSIS — E11.21 TYPE 2 DIABETES WITH NEPHROPATHY (HCC): Primary | ICD-10-CM

## 2018-10-18 DIAGNOSIS — R05.9 COUGH: ICD-10-CM

## 2018-10-18 PROCEDURE — 99001 SPECIMEN HANDLING PT-LAB: CPT | Performed by: INTERNAL MEDICINE

## 2018-10-18 RX ORDER — BENZONATATE 100 MG/1
100 CAPSULE ORAL
Qty: 21 CAP | Refills: 0 | Status: SHIPPED | OUTPATIENT
Start: 2018-10-18 | End: 2021-02-19 | Stop reason: SDUPTHER

## 2018-10-18 RX ORDER — CEFPROZIL 500 MG/1
500 TABLET, FILM COATED ORAL 2 TIMES DAILY
Qty: 20 TAB | Refills: 0 | Status: SHIPPED | OUTPATIENT
Start: 2018-10-18 | End: 2018-10-28

## 2018-10-18 RX ORDER — CODEINE PHOSPHATE AND GUAIFENESIN 10; 100 MG/5ML; MG/5ML
5 SOLUTION ORAL
Qty: 180 ML | Refills: 0 | Status: SHIPPED | OUTPATIENT
Start: 2018-10-18 | End: 2019-03-04 | Stop reason: SDUPTHER

## 2018-10-18 NOTE — PROGRESS NOTES
HISTORY OF PRESENT ILLNESS Leni Daniels is a 72 y.o. male. Visit Vitals /65 (BP 1 Location: Left arm, BP Patient Position: Sitting) Pulse 95 Temp 98.7 °F (37.1 °C) (Oral) Resp 16 Ht 5' 8\" (1.727 m) Wt 195 lb (88.5 kg) SpO2 91% BMI 29.65 kg/m² Chest cold sxs in pt with COPD and lung cancer. Feels deeper in chest and can not cough up the phlegm that feels \"stuck\" Diabetes The history is provided by the patient. This is a chronic problem. The current episode started more than 1 week ago. The problem occurs daily. The problem has not changed since onset. Associated symptoms include shortness of breath. Pertinent negatives include no chest pain. Exacerbated by: diet. The symptoms are relieved by medications (diet). Cold Symptoms The history is provided by the patient. This is a new problem. The current episode started more than 2 days ago. The problem occurs constantly. The cough is productive of sputum. There has been no fever. Associated symptoms include shortness of breath. Pertinent negatives include no chest pain and no chills. He has tried nothing for the symptoms. Review of Systems Constitutional: Negative for chills and fever. Respiratory: Positive for cough, sputum production and shortness of breath. Cardiovascular: Negative for chest pain and palpitations. Physical Exam  
Constitutional: He is oriented to person, place, and time. He appears well-developed and well-nourished. No distress. Cardiovascular: Normal rate and regular rhythm. Pulmonary/Chest: Effort normal and breath sounds normal. No respiratory distress. He has no wheezes. Some deep rhonchi appreciated. Decreased breath sound throughout both lungs as expected. Musculoskeletal: He exhibits no edema. Neurological: He is alert and oriented to person, place, and time. Skin: Skin is warm and dry. He is not diaphoretic. Psychiatric: He has a normal mood and affect. Nursing note and vitals reviewed. ASSESSMENT and PLAN 
  ICD-10-CM ICD-9-CM 1. Type 2 diabetes with nephropathy (HCC) V13.89 534.31 METABOLIC PANEL, BASIC  
  583.81 HEMOGLOBIN A1C W/O EAG 2. Chronic obstructive pulmonary disease, unspecified COPD type (HCC) J44.9 496 CBC WITH AUTOMATED DIFF  
   guaiFENesin-codeine (ROBITUSSIN AC) 100-10 mg/5 mL solution 3. Upper respiratory tract infection, unspecified type J06.9 465.9 CBC WITH AUTOMATED DIFF  
   benzonatate (TESSALON PERLES) 100 mg capsule  
   guaiFENesin-codeine (ROBITUSSIN AC) 100-10 mg/5 mL solution  
   cefPROZIL (CEFZIL) 500 mg tablet 4. Hyperlipidemia, unspecified hyperlipidemia type E78.5 272.4 5. Cough R05 786.2 guaiFENesin-codeine (ROBITUSSIN AC) 100-10 mg/5 mL solution Update lab--DM has been controlled 
 
tx the URI given his underlying lung disease F/u 4 months

## 2018-10-18 NOTE — PROGRESS NOTES
ROOM # 6 Dallas Roman presents today for Chief Complaint Patient presents with  Diabetes  GERD Dallas Roman preferred language for health care discussion is english/other. Is someone accompanying this pt? No 
 
Is the patient using any DME equipment during OV? Yes, walker and oxygen Depression Screening: PHQ over the last two weeks 10/18/2018 6/18/2018 9/19/2017 7/25/2017 6/8/2017 5/3/2017 2/8/2017 Little interest or pleasure in doing things Not at all Not at all Not at all Not at all Not at all Not at all Not at all Feeling down, depressed, irritable, or hopeless Not at all Not at all Not at all Not at all Not at all Not at all Not at all Total Score PHQ 2 0 0 0 0 0 0 0 Learning Assessment: 
Learning Assessment 8/5/2014 PRIMARY LEARNER Patient HIGHEST LEVEL OF EDUCATION - PRIMARY LEARNER  GRADUATED HIGH SCHOOL OR GED  
BARRIERS PRIMARY LEARNER NONE  
CO-LEARNER CAREGIVER No  
PRIMARY LANGUAGE ENGLISH  
LEARNER PREFERENCE PRIMARY DEMONSTRATION  
  LISTENING  
  READING  
ANSWERED BY patient RELATIONSHIP SELF Abuse Screening: 
Abuse Screening Questionnaire 9/19/2017 3/16/2016 2/18/2016 1/21/2016 11/27/2015 10/30/2015 10/9/2015 Do you ever feel afraid of your partner? N N N N N N N Are you in a relationship with someone who physically or mentally threatens you? N N N N N N N Is it safe for you to go home? Marcela Iniguez Fall Risk Fall Risk Assessment, last 12 mths 10/18/2018 6/18/2018 9/19/2017 7/25/2017 6/8/2017 5/3/2017 2/8/2017 Able to walk? Yes Yes Yes Yes Yes Yes Yes Fall in past 12 months? No No No No No No No  
 
 
Visit Vitals /65 (BP 1 Location: Left arm, BP Patient Position: Sitting) Pulse 95 Temp 98.7 °F (37.1 °C) (Oral) Resp 16 Ht 5' 8\" (1.727 m) Wt 195 lb (88.5 kg) SpO2 (!) 88% BMI 29.65 kg/m² Health Maintenance reviewed and discussed per provider. Yes Dallas Roman is due for Health Maintenance Due Topic Date Due  Shingrix Vaccine Age 50> (1 of 2) 06/11/1997  
 EYE EXAM RETINAL OR DILATED Q1  05/17/2018  Influenza Age 5 to Adult  08/01/2018 Please order/place referral if appropriate. Advance Directive: 1. Do you have an advance directive in place? Patient Reply: No 
 
2. If not, would you like material regarding how to put one in place? Patient Reply: No 
 
Coordination of Care: 1. Have you been to the ER, urgent care clinic since your last visit? Hospitalized since your last visit? No 
 
2. Have you seen or consulted any other health care providers outside of the [address] [place] [address] since your last visit? Include any pap smears or colon screening.  No

## 2018-10-18 NOTE — LETTER
10/19/2018 2:20 PM 
 
Mr. Bridger Lovett [address] [facility] [address] [id number] Dear Bridger Lovett: 
 
Please find your most recent results below. Resulted Orders CBC WITH AUTOMATED DIFF Result Value Ref Range WBC 7.1 3.4 - 10.8 x10E3/uL  
 RBC 4.52 4.14 - 5.80 x10E6/uL HGB 13.3 13.0 - 17.7 g/dL HCT 40.4 37.5 - 51.0 % MCV 89 79 - 97 fL  
 MCH 29.4 26.6 - 33.0 pg  
 MCHC 32.9 31.5 - 35.7 g/dL  
 RDW 15.8 (H) 12.3 - 15.4 % PLATELET 034 656 - 358 x10E3/uL NEUTROPHILS 59 Not Estab. % Lymphocytes 30 Not Estab. % MONOCYTES 7 Not Estab. % EOSINOPHILS 4 Not Estab. % BASOPHILS 0 Not Estab. %  
 ABS. NEUTROPHILS 4.1 1.4 - 7.0 x10E3/uL Abs Lymphocytes 2.1 0.7 - 3.1 x10E3/uL  
 ABS. MONOCYTES 0.5 0.1 - 0.9 x10E3/uL  
 ABS. EOSINOPHILS 0.3 0.0 - 0.4 x10E3/uL  
 ABS. BASOPHILS 0.0 0.0 - 0.2 x10E3/uL IMMATURE GRANULOCYTES 0 Not Estab. %  
 ABS. IMM. GRANS. 0.0 0.0 - 0.1 x10E3/uL Narrative Performed at:  [address]  [id number] : Amanda Vera MD, Phone:  [phone] METABOLIC PANEL, BASIC Result Value Ref Range Glucose 148 (H) 65 - 99 mg/dL BUN 14 8 - 27 mg/dL Creatinine 0.85 0.76 - 1.27 mg/dL GFR est non-AA 88 >59 mL/min/1.73 GFR est  >59 mL/min/1.73  
 BUN/Creatinine ratio 16 10 - 24 Sodium 144 134 - 144 mmol/L Potassium 4.0 3.5 - 5.2 mmol/L Chloride 98 96 - 106 mmol/L  
 CO2 28 20 - 29 mmol/L Calcium 9.6 8.6 - 10.2 mg/dL Narrative Performed at:  [place] [address]  [id number] : Amanda Vera MD, Phone:  [phone] HEMOGLOBIN A1C W/O EAG Result Value Ref Range Hemoglobin A1c 7.1 (H) 4.8 - 5.6 % Comment:  
            Prediabetes: 5.7 - 6.4 Diabetes: >6.4 Glycemic control for adults with diabetes: <7.0 Narrative Performed at:  [place] [address] [address]  [id number] : Sarath Gomez MD, Phone:  [phone] RECOMMENDATIONS: 
This is all fine Please call me if you have any questions: [phone] Sincerely, Brant Demarco MD

## 2018-10-19 LAB
BASOPHILS # BLD AUTO: 0 X10E3/UL (ref 0–0.2)
BASOPHILS NFR BLD AUTO: 0 %
BUN SERPL-MCNC: 14 MG/DL (ref 8–27)
BUN/CREAT SERPL: 16 (ref 10–24)
CALCIUM SERPL-MCNC: 9.6 MG/DL (ref 8.6–10.2)
CHLORIDE SERPL-SCNC: 98 MMOL/L (ref 96–106)
CO2 SERPL-SCNC: 28 MMOL/L (ref 20–29)
CREAT SERPL-MCNC: 0.85 MG/DL (ref 0.76–1.27)
EOSINOPHIL # BLD AUTO: 0.3 X10E3/UL (ref 0–0.4)
EOSINOPHIL NFR BLD AUTO: 4 %
ERYTHROCYTE [DISTWIDTH] IN BLOOD BY AUTOMATED COUNT: 15.8 % (ref 12.3–15.4)
GLUCOSE SERPL-MCNC: 148 MG/DL (ref 65–99)
HBA1C MFR BLD: 7.1 % (ref 4.8–5.6)
HCT VFR BLD AUTO: 40.4 % (ref 37.5–51)
HGB BLD-MCNC: 13.3 G/DL (ref 13–17.7)
IMM GRANULOCYTES # BLD: 0 X10E3/UL (ref 0–0.1)
IMM GRANULOCYTES NFR BLD: 0 %
LYMPHOCYTES # BLD AUTO: 2.1 X10E3/UL (ref 0.7–3.1)
LYMPHOCYTES NFR BLD AUTO: 30 %
MCH RBC QN AUTO: 29.4 PG (ref 26.6–33)
MCHC RBC AUTO-ENTMCNC: 32.9 G/DL (ref 31.5–35.7)
MCV RBC AUTO: 89 FL (ref 79–97)
MONOCYTES # BLD AUTO: 0.5 X10E3/UL (ref 0.1–0.9)
MONOCYTES NFR BLD AUTO: 7 %
NEUTROPHILS # BLD AUTO: 4.1 X10E3/UL (ref 1.4–7)
NEUTROPHILS NFR BLD AUTO: 59 %
PLATELET # BLD AUTO: 225 X10E3/UL (ref 150–379)
POTASSIUM SERPL-SCNC: 4 MMOL/L (ref 3.5–5.2)
RBC # BLD AUTO: 4.52 X10E6/UL (ref 4.14–5.8)
SODIUM SERPL-SCNC: 144 MMOL/L (ref 134–144)
WBC # BLD AUTO: 7.1 X10E3/UL (ref 3.4–10.8)

## 2018-12-13 ENCOUNTER — DOCUMENTATION ONLY (OUTPATIENT)
Dept: INTERNAL MEDICINE CLINIC | Age: 73
End: 2018-12-13

## 2018-12-13 NOTE — PROGRESS NOTES
Called patient's eye doctor and left message with Lolis(technician) requesting last eye exam notes be faxed to us.

## 2019-01-10 RX ORDER — IPRATROPIUM BROMIDE AND ALBUTEROL SULFATE 2.5; .5 MG/3ML; MG/3ML
3 SOLUTION RESPIRATORY (INHALATION)
Qty: 90 ML | Refills: 5 | Status: SHIPPED | OUTPATIENT
Start: 2019-01-10 | End: 2019-07-31 | Stop reason: SDUPTHER

## 2019-01-14 ENCOUNTER — TELEPHONE (OUTPATIENT)
Dept: INTERNAL MEDICINE CLINIC | Age: 74
End: 2019-01-14

## 2019-01-14 NOTE — TELEPHONE ENCOUNTER
Patient would like to speak with Dr. Celedonio Baumgarten nurse in reference to the Spiriva medication. States his new Ins will no longer cover this and they are recommending ProAir.

## 2019-01-15 NOTE — TELEPHONE ENCOUNTER
2 pt. Identifiers confirmed. Pt. Notified of below. He will call pharmacy and/or inusrance to find out if they will cover Atrovent and call back to let us know.

## 2019-01-15 NOTE — TELEPHONE ENCOUNTER
proair is NOT in the same class. They have to cover something that is similar. Will they cover atrovent?

## 2019-01-19 NOTE — TELEPHONE ENCOUNTER
2 pt. Identifiers confirmed. Called pt to find out what was the outcome of the insurance coverage for medication. Pt stated he will return to [facility] and forego the medicine until he straightens his insurance out. No further questions or concerns at this time.

## 2019-01-30 ENCOUNTER — TELEPHONE (OUTPATIENT)
Dept: INTERNAL MEDICINE CLINIC | Age: 74
End: 2019-01-30

## 2019-01-30 NOTE — TELEPHONE ENCOUNTER
Pharmacy requesting prior auth for ALBUTEROL SULFATE  (90 Base) MCG/Act aerosol    Document scanned to media.

## 2019-02-05 NOTE — TELEPHONE ENCOUNTER
PA attempted. Message reads [employer]'s covered (formulary) drugs\ is Ventolin HFA aerosol inhaler (brand). If Ventolin HFA aerosol inhaler (brand) is prescribed, a review by [facility] is not required. Pharm contacted. No further action needed, pt was dispensed Ventolin HFA.

## 2019-03-04 ENCOUNTER — OFFICE VISIT (OUTPATIENT)
Dept: INTERNAL MEDICINE CLINIC | Age: 74
End: 2019-03-04

## 2019-03-04 VITALS
HEART RATE: 104 BPM | RESPIRATION RATE: 18 BRPM | HEIGHT: 68 IN | TEMPERATURE: 98.5 F | SYSTOLIC BLOOD PRESSURE: 124 MMHG | DIASTOLIC BLOOD PRESSURE: 76 MMHG | OXYGEN SATURATION: 95 % | WEIGHT: 191 LBS | BODY MASS INDEX: 28.95 KG/M2

## 2019-03-04 DIAGNOSIS — E11.21 TYPE 2 DIABETES WITH NEPHROPATHY (HCC): ICD-10-CM

## 2019-03-04 DIAGNOSIS — R05.9 COUGH: ICD-10-CM

## 2019-03-04 DIAGNOSIS — E78.5 HYPERLIPIDEMIA, UNSPECIFIED HYPERLIPIDEMIA TYPE: Chronic | ICD-10-CM

## 2019-03-04 DIAGNOSIS — I10 ESSENTIAL HYPERTENSION: Primary | ICD-10-CM

## 2019-03-04 DIAGNOSIS — J44.9 CHRONIC OBSTRUCTIVE PULMONARY DISEASE, UNSPECIFIED COPD TYPE (HCC): ICD-10-CM

## 2019-03-04 DIAGNOSIS — Z23 ENCOUNTER FOR IMMUNIZATION: ICD-10-CM

## 2019-03-04 RX ORDER — CODEINE PHOSPHATE AND GUAIFENESIN 10; 100 MG/5ML; MG/5ML
5 SOLUTION ORAL
Qty: 180 ML | Refills: 0 | Status: SHIPPED | OUTPATIENT
Start: 2019-03-04 | End: 2020-02-12 | Stop reason: SDUPTHER

## 2019-03-04 NOTE — PROGRESS NOTES
HISTORY OF PRESENT ILLNESS Pipo Miller is a 72 y.o. male. Visit Vitals /76 Pulse (!) 104 Temp 98.5 °F (36.9 °C) Resp 18 Ht 5' 8\" (1.727 m) Wt 191 lb (86.6 kg) SpO2 95% BMI 29.04 kg/m² Has lung cancer as well. Will be having an updated CT soon Current Outpatient Medications: 
roflumilast (DALIRESP) 500 mcg tab tablet, Take 500 mcg by mouth. albuterol-ipratropium (DUO-NEB) 2.5 mg-0.5 mg/3 ml nebu, 3 ML BY NEBULIZATION ROUTE EVERY SIX (6) HOURS AS NEEDED. guaiFENesin-codeine (ROBITUSSIN AC) 100-10 mg/5 mL solution, Take 5 mL by mouth three (3) times daily as needed for Cough. Max Daily Amount: 15 mL. VENTOLIN HFA 90 mcg/actuation inhaler, USE 2 PUFFS EVERY FOUR HOURS ADVAIR DISKUS 500-50 mcg/dose diskus inhaler, USE 1 PUFF EVERY 12 HOURS 
simvastatin (ZOCOR) 20 mg tablet, TAKE 1 TABLET BY MOUTH EVERY EVENING 
metFORMIN (GLUCOPHAGE) 500 mg tablet, Take 1 Tab by mouth two (2) times daily (with meals). azithromycin (ZITHROMAX) 250 mg tablet, Take two tablets today then one tablet daily 
pantoprazole (PROTONIX) 40 mg tablet, TAKE 1 TABLET BY MOUTH DAILY. losartan (COZAAR) 50 mg tablet, Take 1 Tab by mouth daily. Blood-Glucose Meter (ACCU-CHEK SHERRI) misc, Use to test blood sugar daily or as directed 
glucose blood VI test strips (ACCU-CHEK SMARTVIEW TEST STRIP) strip, Use to test blood sugar daily Blood Glucose Control, Normal (ACCU-CHEK SMARTVIEW CONTRL SOL) soln, Use to test meter with each new bottle of test strips Lancets (ACCU-CHEK FASTCLIX) misc, Use to test blood sugar daily 
alcohol swabs (BD SINGLE USE SWABS REGULAR) padm, Use as directed to test blood sugar SPIRIVA WITH HANDIHALER 18 mcg inhalation capsule, USE 1 CAPSULE VIA HANDIHALER ONCE A DAY FOR INHALATION 
clotrimazole-betamethasone (LOTRISONE) topical cream, Apply to affected area BID for 2 weeks.  
albuterol (PROVENTIL VENTOLIN) 2.5 mg /3 mL (0.083 %) nebulizer solution, 3 mL by Nebulization route every four (4) hours as needed for Wheezing. Dispense 100 unit dose vials 
bumetanide (BUMEX) 1 mg tablet, Take one tablet weekly 
metoprolol succinate (TOPROL-XL) 25 mg XL tablet, Take 1 tablet by mouth daily. Aspirin, Buffered 81 mg tab, Take  by mouth. 
potassium chloride SR (KLOR-CON 10) 10 mEq tablet, Take 20 mEq by mouth two (2) times a day. No current facility-administered medications for this visit. Hypertension The history is provided by the patient. This is a chronic problem. The current episode started more than 1 week ago. The problem has not changed since onset. Associated symptoms include shortness of breath. Pertinent negatives include no chest pain, no palpitations, no headaches and no dizziness. Risk factors include a sedentary lifestyle, obesity, dyslipidemia, diabetes mellitus and hypertension. Cough The history is provided by the patient (chronic bronchitis). This is a chronic problem. The current episode started more than 1 week ago. The problem occurs daily. The problem has not changed since onset. Associated symptoms include shortness of breath. Pertinent negatives include no chest pain and no headaches. The symptoms are relieved by medications. Treatments tried: see med list.  
Diabetes Associated symptoms include shortness of breath. Pertinent negatives include no chest pain and no headaches. Review of Systems Constitutional: Negative for chills and fever. Respiratory: Positive for cough, sputum production and shortness of breath. Cardiovascular: Negative for chest pain and palpitations. Genitourinary: Positive for frequency. Neurological: Negative for dizziness and headaches. Endo/Heme/Allergies: Negative for polydipsia. Physical Exam  
Constitutional: He is oriented to person, place, and time. He appears well-developed and well-nourished. No distress. Cardiovascular: Normal rate and regular rhythm. Pulmonary/Chest: Effort normal and breath sounds normal.  
Musculoskeletal: He exhibits no edema. Neurological: He is alert and oriented to person, place, and time. Skin: Skin is warm and dry. He is not diaphoretic. Psychiatric: He has a normal mood and affect. Nursing note and vitals reviewed. ASSESSMENT and PLAN 
  ICD-10-CM ICD-9-CM 1. Essential hypertension F53 992.1 METABOLIC PANEL, COMPREHENSIVE  
   LIPID PANEL 2. Type 2 diabetes with nephropathy (HCC) U19.52 131.52 METABOLIC PANEL, COMPREHENSIVE  
  583.81 HEMOGLOBIN A1C W/O EAG  
   MICROALBUMIN, UR, RAND W/ MICROALB/CREAT RATIO 3. Chronic obstructive pulmonary disease, unspecified [facility] J44.9 496 guaiFENesin-codeine (ROBITUSSIN AC) 100-10 mg/5 mL solution 4. Cough R05 786.2 guaiFENesin-codeine (ROBITUSSIN AC) 100-10 mg/5 mL solution 5. Hyperlipidemia, unspecified hyperlipidemia type E78.5 272.4 LIPID PANEL 6. Encounter for immunization Z23 V03.89 INFLUENZA VACCINE INACTIVATED (IIV), SUBUNIT, ADJUVANTED, IM  
   ADMIN INFLUENZA VIRUS VAC  
 
 
BP looks good after resting Cough/COPD about the same Lung cancer Update lab Discussed BMI/weight, lifestyle, diet and exercise. Discussed effect on blood pressure, blood sugar, and joints especially Focus on limiting white carbs, portion control, and moving more.  
 
F/u 4 months for MWV, DM, HTN,

## 2019-03-04 NOTE — PROGRESS NOTES
ROOM # 5 Virginia Maria presents today for Chief Complaint Patient presents with  Hypertension  Diabetes Virginia Maria preferred language for health care discussion is english/other. Is someone accompanying this pt? no 
 
Is the patient using any DME equipment during [address]? rollator Depression Screening: 
3 most recent [facility] Screens 10/18/2018 6/18/2018 9/19/2017 7/25/2017 6/8/2017 5/3/2017 2/8/2017 Little interest or pleasure in doing things Not at all Not at all Not at all Not at all Not at all Not at all Not at all Feeling down, depressed, irritable, or hopeless Not at all Not at all Not at all Not at all Not at all Not at all Not at all Total Score PHQ 2 0 0 0 0 0 0 0 Learning Assessment: 
Learning Assessment 8/5/2014 PRIMARY LEARNER Patient HIGHEST LEVEL OF EDUCATION - PRIMARY LEARNER  GRADUATED HIGH SCHOOL OR GED  
BARRIERS PRIMARY LEARNER NONE  
CO-LEARNER CAREGIVER No  
PRIMARY LANGUAGE ENGLISH  
LEARNER PREFERENCE PRIMARY DEMONSTRATION  
  LISTENING  
  READING  
ANSWERED BY patient RELATIONSHIP SELF Abuse Screening: 
Abuse Screening Questionnaire 9/19/2017 3/16/2016 2/18/2016 1/21/2016 11/27/2015 10/30/2015 10/9/2015 Do you ever feel afraid of your partner? N N N N N N N Are you in a relationship with someone who physically or mentally threatens you? N N N N N N N Is it safe for you to go home? Ghazala Rodríguez Fall Risk Fall Risk Assessment, last 12 mths 10/18/2018 6/18/2018 9/19/2017 7/25/2017 6/8/2017 5/3/2017 2/8/2017 Able to walk? Yes Yes Yes Yes Yes Yes Yes Fall in past 12 months? No No No No No No No  
 
 
Health Maintenance reviewed and discussed per provider. Yes Virginia Maria is due for Health Maintenance Due Topic Date Due  Shingrix Vaccine Age 50> (1 of 2) 06/11/1997  Influenza Age 5 to Adult  08/01/2018  MICROALBUMIN Q1  02/05/2019 Please order/place referral if appropriate. Advance Directive: 1. Do you have an advance directive in place? Patient Reply: no 
 
2. If not, would you like material regarding how to put one in place? Patient Reply: no 
 
Coordination of Care: 1. Have you been to the ER, urgent care clinic since your last visit? Hospitalized since your last visit? no 
 
2. Have you seen or consulted any other health care providers outside of the [address] [place] [address] since your last visit? Include any pap smears or colon screening.  Pulmonologist

## 2019-03-06 LAB
ALBUMIN SERPL-MCNC: 4.3 G/DL (ref 3.5–4.8)
ALBUMIN/CREAT UR: 32.4 MG/G CREAT (ref 0–30)
ALBUMIN/GLOB SERPL: 1.5 {RATIO} (ref 1.2–2.2)
ALP SERPL-CCNC: 93 IU/L (ref 39–117)
ALT SERPL-CCNC: 17 IU/L (ref 0–44)
AST SERPL-CCNC: 20 IU/L (ref 0–40)
BILIRUB SERPL-MCNC: <0.2 MG/DL (ref 0–1.2)
BUN SERPL-MCNC: 13 MG/DL (ref 8–27)
BUN/CREAT SERPL: 16 (ref 10–24)
CALCIUM SERPL-MCNC: 9.8 MG/DL (ref 8.6–10.2)
CHLORIDE SERPL-SCNC: 97 MMOL/L (ref 96–106)
CHOLEST SERPL-MCNC: 153 MG/DL (ref 100–199)
CO2 SERPL-SCNC: 29 MMOL/L (ref 20–29)
CREAT SERPL-MCNC: 0.83 MG/DL (ref 0.76–1.27)
CREAT UR-MCNC: 122.8 MG/DL
GLOBULIN SER CALC-MCNC: 2.8 G/DL (ref 1.5–4.5)
GLUCOSE SERPL-MCNC: 122 MG/DL (ref 65–99)
HBA1C MFR BLD: 6.5 % (ref 4.8–5.6)
HDLC SERPL-MCNC: 53 MG/DL
INTERPRETATION, 910389: NORMAL
LDLC SERPL CALC-MCNC: 75 MG/DL (ref 0–99)
MICROALBUMIN UR-MCNC: 39.8 UG/ML
POTASSIUM SERPL-SCNC: 3.9 MMOL/L (ref 3.5–5.2)
PROT SERPL-MCNC: 7.1 G/DL (ref 6–8.5)
SODIUM SERPL-SCNC: 141 MMOL/L (ref 134–144)
TRIGL SERPL-MCNC: 123 MG/DL (ref 0–149)
VLDLC SERPL CALC-MCNC: 25 MG/DL (ref 5–40)

## 2019-05-23 RX ORDER — PANTOPRAZOLE SODIUM 40 MG/1
TABLET, DELAYED RELEASE ORAL
Qty: 90 TAB | Refills: 3 | Status: SHIPPED | OUTPATIENT
Start: 2019-05-23 | End: 2020-02-12 | Stop reason: SDUPTHER

## 2019-06-05 RX ORDER — LOSARTAN POTASSIUM 50 MG/1
TABLET ORAL
Qty: 90 TAB | Refills: 3 | Status: SHIPPED | OUTPATIENT
Start: 2019-06-05 | End: 2020-05-26

## 2019-07-09 ENCOUNTER — OFFICE VISIT (OUTPATIENT)
Dept: INTERNAL MEDICINE CLINIC | Age: 74
End: 2019-07-09

## 2019-07-09 VITALS
BODY MASS INDEX: 28.95 KG/M2 | TEMPERATURE: 98.7 F | HEART RATE: 91 BPM | OXYGEN SATURATION: 94 % | DIASTOLIC BLOOD PRESSURE: 81 MMHG | SYSTOLIC BLOOD PRESSURE: 138 MMHG | WEIGHT: 191 LBS | RESPIRATION RATE: 20 BRPM | HEIGHT: 68 IN

## 2019-07-09 DIAGNOSIS — E78.5 HYPERLIPIDEMIA, UNSPECIFIED HYPERLIPIDEMIA TYPE: Chronic | ICD-10-CM

## 2019-07-09 DIAGNOSIS — J44.9 CHRONIC OBSTRUCTIVE PULMONARY DISEASE, UNSPECIFIED COPD TYPE (HCC): ICD-10-CM

## 2019-07-09 DIAGNOSIS — E11.9 CONTROLLED TYPE 2 DIABETES MELLITUS WITHOUT COMPLICATION, WITHOUT LONG-TERM CURRENT USE OF INSULIN (HCC): Chronic | ICD-10-CM

## 2019-07-09 DIAGNOSIS — I10 ESSENTIAL HYPERTENSION: ICD-10-CM

## 2019-07-09 DIAGNOSIS — Z00.00 MEDICARE ANNUAL WELLNESS VISIT, SUBSEQUENT: Primary | ICD-10-CM

## 2019-07-09 NOTE — ACP (ADVANCE CARE PLANNING)
Advance Care Planning (ACP) Provider Conversation Snapshot    Date of ACP Conversation: 07/09/19  Persons included in Conversation:  patient  Length of ACP Conversation in minutes:  <16 minutes (Non-Billable)    Authorized Decision Maker (if patient is incapable of making informed decisions): This person is: Other Legally Authorized Decision Maker (e.g. Next of Kin)--daughter            For Patients with Decision Making Capacity:   Values/Goals: Exploration of values, goals, and preferences if recovery is not expected, even with continued medical treatment in the event of:  Imminent death  Severe, permanent brain injury  \"In these circumstances, what matters most to you? \"  Care focused more on comfort or quality of life. Conversation Outcomes / Follow-Up Plan:   pt has discussed with daughter, and his brother and sister.

## 2019-07-09 NOTE — PROGRESS NOTES
HISTORY OF PRESENT ILLNESS  Sara Olmedo is a 69 y.o. male. /81 (BP 1 Location: Right arm, BP Patient Position: Sitting)   Pulse 91   Temp 98.7 °F (37.1 °C) (Oral)   Resp 20   Ht 5' 8\" (1.727 m)   Wt 191 lb (86.6 kg)   SpO2 94% Comment: pt just put on oxygen  BMI 29.04 kg/m²               Current Outpatient Medications:  losartan (COZAAR) 50 mg tablet, TAKE 1 TABLET BY MOUTH EVERY DAY  pantoprazole (PROTONIX) 40 mg tablet, TAKE 1 TABLET BY MOUTH EVERY DAY  albuterol-ipratropium (DUO-NEB) 2.5 mg-0.5 mg/3 ml nebu, 3 ML BY NEBULIZATION ROUTE EVERY SIX (6) HOURS AS NEEDED. VENTOLIN HFA 90 mcg/actuation inhaler, USE 2 PUFFS EVERY FOUR HOURS  ADVAIR DISKUS 500-50 mcg/dose diskus inhaler, USE 1 PUFF EVERY 12 HOURS  metFORMIN (GLUCOPHAGE) 500 mg tablet, Take 1 Tab by mouth two (2) times daily (with meals). Blood-Glucose Meter (ACCU-CHEK SHERRI) misc, Use to test blood sugar daily or as directed  glucose blood VI test strips (ACCU-CHEK SMARTVIEW TEST STRIP) strip, Use to test blood sugar daily  Blood Glucose Control, Normal (ACCU-CHEK SMARTVIEW CONTRL SOL) soln, Use to test meter with each new bottle of test strips  Lancets (ACCU-CHEK FASTCLIX) misc, Use to test blood sugar daily  alcohol swabs (BD SINGLE USE SWABS REGULAR) padm, Use as directed to test blood sugar  SPIRIVA WITH HANDIHALER 18 mcg inhalation capsule, USE 1 CAPSULE VIA HANDIHALER ONCE A DAY FOR INHALATION  clotrimazole-betamethasone (LOTRISONE) topical cream, Apply to affected area BID for 2 weeks. albuterol (PROVENTIL VENTOLIN) 2.5 mg /3 mL (0.083 %) nebulizer solution, 3 mL by Nebulization route every four (4) hours as needed for Wheezing. Dispense 100 unit dose vials  bumetanide (BUMEX) 1 mg tablet, Take one tablet weekly  metoprolol succinate (TOPROL-XL) 25 mg XL tablet, Take 1 tablet by mouth daily.   Aspirin, Buffered 81 mg tab, Take  by mouth.  potassium chloride SR (KLOR-CON 10) 10 mEq tablet, Take 20 mEq by mouth two (2) times a day.  roflumilast (DALIRESP) 500 mcg tab tablet, Take 500 mcg by mouth. simvastatin (ZOCOR) 20 mg tablet, TAKE 1 TABLET BY MOUTH EVERY EVENING    No current facility-administered medications for this visit. Diabetes   The history is provided by the patient. This is a chronic problem. The current episode started more than 1 week ago. The problem occurs daily. The problem has not changed since onset. Associated symptoms include shortness of breath. Pertinent negatives include no chest pain and no headaches. Exacerbated by: diet. The symptoms are relieved by medications (diet). Treatments tried: see med list. The treatment provided moderate relief. Hypertension    The history is provided by the patient. This is a chronic problem. The current episode started more than 1 week ago. The problem has not changed since onset. Associated symptoms include shortness of breath. Pertinent negatives include no chest pain, no palpitations, no headaches and no dizziness. There are no associated agents to hypertension. Risk factors include male gender and diabetes mellitus. Review of Systems   Constitutional: Negative for chills and fever. Respiratory: Positive for cough and shortness of breath. Unchanged. Baseline   Cardiovascular: Negative for chest pain and palpitations. Neurological: Negative for dizziness and headaches. Physical Exam   Constitutional: He is oriented to person, place, and time. He appears well-developed and well-nourished. No distress. HENT:   Right Ear: Tympanic membrane, external ear and ear canal normal.   Left Ear: Tympanic membrane, external ear and ear canal normal.   Mouth/Throat: Oropharynx is clear and moist.   Slight cerumen in left ear canal   Cardiovascular: Normal rate and regular rhythm. Pulmonary/Chest: Effort normal and breath sounds normal. No respiratory distress. He has no wheezes. He has no rales. Musculoskeletal: He exhibits edema (trace).    Neurological: He is alert and oriented to person, place, and time. Diabetic foot exam:     Left Foot:   Visual Exam: marked bunion, overlapping toes   Pulse DP: 2+ (normal)   Filament test: normal sensation    Vibratory sensation: diminished      Right Foot:   Visual Exam: marked bunion and overlapping toes   Pulse DP: 2+ (normal)   Filament test: normal sensation    Vibratory sensation: diminished     Skin: Skin is warm and dry. He is not diaphoretic. Dry skin on feet, thickened nails   Psychiatric: He has a normal mood and affect. Nursing note and vitals reviewed. ASSESSMENT and PLAN    ICD-10-CM ICD-9-CM           2. Controlled type 2 diabetes mellitus without complication, without long-term current use of insulin ([facility]) N97.6 488.09 METABOLIC PANEL, COMPREHENSIVE      HEMOGLOBIN A1C W/O EAG       DIABETES FOOT EXAM   3. Essential hypertension V20 492.7 METABOLIC PANEL, COMPREHENSIVE   4. Hyperlipidemia, unspecified hyperlipidemia type E78.5 272.4 LIPID PANEL   5.  Chronic obstructive pulmonary disease, unspecified COPD [facility] ([facility] J44.9 496          Update lab    BP controlled    COPD stable (on oxygen)    F/u 4 months for HTN, DM, chol, COPD

## 2019-07-09 NOTE — PROGRESS NOTES
[address] presents today for   Chief Complaint   Patient presents with    Annual Wellness Visit    Diabetes    Hypertension       Elba Do preferred language for health care discussion is english/other. Is someone accompanying this pt? no    Is the patient using any DME equipment during [address]? Rollator, oxygen    Depression Screening:  3 most recent [facility] 36 Screens 10/18/2018 6/18/2018 9/19/2017 7/25/2017 6/8/2017 5/3/2017 2/8/2017   Little interest or pleasure in doing things Not at all Not at all Not at all Not at all Not at all Not at all Not at all   Feeling down, depressed, irritable, or hopeless Not at all Not at all Not at all Not at all Not at all Not at all Not at all   Total Score PHQ 2 0 0 0 0 0 0 0       Learning Assessment:  Learning Assessment 8/5/2014   PRIMARY LEARNER Patient   HIGHEST LEVEL OF EDUCATION - PRIMARY LEARNER  GRADUATED Alexi Dunlap 95 PRIMARY LEARNER NONE   CO-LEARNER CAREGIVER No   PRIMARY LANGUAGE ENGLISH   LEARNER PREFERENCE PRIMARY DEMONSTRATION     LISTENING     READING   ANSWERED BY patient   RELATIONSHIP SELF       Abuse Screening:  Abuse Screening Questionnaire 7/9/2019 9/19/2017 3/16/2016 2/18/2016 1/21/2016 11/27/2015 10/30/2015   Do you ever feel afraid of your partner? N N N N N N N   Are you in a relationship with someone who physically or mentally threatens you? N N N N N N N   Is it safe for you to go home? Yu Sheets Y       Fall Risk  Fall Risk Assessment, last 12 mths 10/18/2018 6/18/2018 9/19/2017 7/25/2017 6/8/2017 5/3/2017 2/8/2017   Able to walk? Yes Yes Yes Yes Yes Yes Yes   Fall in past 12 months? No No No No No No No       Health Maintenance reviewed and discussed per provider.  Yes    Elba Gave is due for   Health Maintenance Due   Topic Date Due    DTaP/Tdap/Td series (1 - Tdap) 06/11/1968    Shingrix Vaccine Age 50> (1 of 2) 06/11/1997    FOOT EXAM Q1  06/18/2019    MEDICARE YEARLY EXAM  06/19/2019 Please order/place referral if appropriate. Advance Directive:  1. Do you have an advance directive in place? Patient Reply: no    2. If not, would you like material regarding how to put one in place? Patient Reply: no    Coordination of Care:  1. Have you been to the ER, urgent care clinic since your last visit? Hospitalized since your last visit? no    2. Have you seen or consulted any other health care providers outside of the [address] [place] [address] since your last visit? Include any pap smears or colon screening.  no

## 2019-07-09 NOTE — PATIENT INSTRUCTIONS
Medicare Wellness Visit, Male The best way to live healthy is to have a lifestyle where you eat a well-balanced diet, exercise regularly, limit alcohol use, and quit all forms of tobacco/nicotine, if applicable. Regular preventive services are another way to keep healthy. Preventive services (vaccines, screening tests, monitoring & exams) can help personalize your care plan, which helps you manage your own care. Screening tests can find health problems at the earliest stages, when they are easiest to treat. [address] [facility] follows the current, evidence-based guidelines published by the [facility] Scout ([facility]) when recommending preventive services for our patients. Because we follow these guidelines, sometimes recommendations change over time as research supports it. (For example, a prostate screening blood test is no longer routinely recommended for men with no symptoms.) Of course, you and your doctor may decide to screen more often for some diseases, based on your risk and co-morbidities (chronic disease you are already diagnosed with). Preventive services for you include: - Medicare offers their members a free annual wellness visit, which is time for you and your primary care provider to discuss and plan for your preventive service needs. Take advantage of this benefit every year! 
-All adults over age 72 should receive the recommended pneumonia vaccines. Current USPSTF guidelines recommend a series of two vaccines for the best pneumonia protection.  
-All adults should have a flu vaccine yearly and an ECG.  All adults age 61 and older should receive a shingles vaccine once in their lifetime.   
-All adults age 38-68 who are overweight should have a diabetes screening test once every three years.  
-Other screening tests & preventive services for persons with diabetes include: an eye exam to screen for diabetic retinopathy, a kidney function test, a foot exam, and stricter control over your cholesterol.  
-Cardiovascular screening for adults with routine risk involves an electrocardiogram (ECG) at intervals determined by the provider.  
-Colorectal cancer screening should be done for adults age 54-65 with no increased risk factors for colorectal cancer. There are a number of acceptable methods of screening for this type of cancer. Each test has its own benefits and drawbacks. Discuss with your provider what is most appropriate for you during your annual wellness visit. The different tests include: colonoscopy (considered the best screening method), a fecal occult blood test, a fecal DNA test, and sigmoidoscopy. 
-All adults born between [facility] should be screened once for Hepatitis C. 
-An Abdominal Aortic Aneurysm (AAA) Screening is recommended for men age 73-68 who has ever smoked in their lifetime. Here is a list of your current Health Maintenance items (your personalized list of preventive services) with a due date: 
Health Maintenance Due Topic Date Due  
 Diabetic Foot Care  06/18/2019 [facility] Annual Well Visit  06/19/2019

## 2019-07-09 NOTE — PROGRESS NOTES
This is the Subsequent Medicare Annual Wellness Exam, performed 12 months or more after the Initial AWV or the last Subsequent AWV    I have reviewed the patient's medical history in detail and updated the computerized patient record. History     Past Medical History:   Diagnosis Date    Bunion of great toe of left foot     Bunion of great toe of right foot     CAD (coronary artery disease) 8/5/2014    Chronic obstructive pulmonary disease ([facility] 2006    CTS (carpal tunnel syndrome)     left    Diabetes ([facility] 2006    Diabetic retinopathy, nonproliferative, mild ([facility] 05/04/2018    GERD (gastroesophageal reflux disease) 8/5/2014    Hammertoe     Hyperlipidemia 8/5/2014    Hypertension     Mobility impaired     Oxygen dependent     Pulmonary nodules 03/24/2017    sees Dr. Wilfrido Barajas SCC (squamous cell carcinoma of lung) [facility] 08/22/2017    left apex    Stasis dermatitis       Past Surgical History:   Procedure Laterality Date    HX CORONARY ARTERY BYPASS GRAFT  8/6/2006    4 way bipass     Current Outpatient Medications   Medication Sig Dispense Refill    losartan (COZAAR) 50 mg tablet TAKE 1 TABLET BY MOUTH EVERY DAY 90 Tab 3    pantoprazole (PROTONIX) 40 mg tablet TAKE 1 TABLET BY MOUTH EVERY DAY 90 Tab 3    albuterol-ipratropium (DUO-NEB) 2.5 mg-0.5 mg/3 ml nebu 3 ML BY NEBULIZATION ROUTE EVERY SIX (6) HOURS AS NEEDED. 90 mL 5    VENTOLIN HFA 90 mcg/actuation inhaler USE 2 PUFFS EVERY FOUR HOURS 3 Inhaler 11    ADVAIR DISKUS 500-50 mcg/dose diskus inhaler USE 1 PUFF EVERY 12 HOURS 180 Each 11    metFORMIN (GLUCOPHAGE) 500 mg tablet Take 1 Tab by mouth two (2) times daily (with meals).  180 Tab 5    Blood-Glucose Meter (ACCU-CHEK SHERRI) misc Use to test blood sugar daily or as directed 1 Each prn    glucose blood VI test strips (ACCU-CHEK SMARTVIEW TEST STRIP) strip Use to test blood sugar daily 100 Strip 4    Blood Glucose Control, Normal (ACCU-CHEK SMARTVIEW CONTRL SOL) soln Use to test meter with each new bottle of test strips 2 mL prn    Lancets (ACCU-CHEK FASTCLIX) misc Use to test blood sugar daily 100 Each 4    alcohol swabs (BD SINGLE USE SWABS REGULAR) padm Use as directed to test blood sugar 100 Pad 5    SPIRIVA WITH HANDIHALER 18 mcg inhalation capsule USE 1 CAPSULE VIA HANDIHALER ONCE A DAY FOR INHALATION 30 Cap 5    clotrimazole-betamethasone (LOTRISONE) topical cream Apply to affected area BID for 2 weeks. 30 g 11    albuterol (PROVENTIL VENTOLIN) 2.5 mg /3 mL (0.083 %) nebulizer solution 3 mL by Nebulization route every four (4) hours as needed for Wheezing. Dispense 100 unit dose vials 100 Each 3    bumetanide (BUMEX) 1 mg tablet Take one tablet weekly 12 Tab 3    metoprolol succinate (TOPROL-XL) 25 mg XL tablet Take 1 tablet by mouth daily. 90 tablet 3    Aspirin, Buffered 81 mg tab Take  by mouth.  potassium chloride SR (KLOR-CON 10) 10 mEq tablet Take 20 mEq by mouth two (2) times a day.  roflumilast (DALIRESP) 500 mcg tab tablet Take 500 mcg by mouth.       simvastatin (ZOCOR) 20 mg tablet TAKE 1 TABLET BY MOUTH EVERY EVENING 90 Tab 4     Allergies   Allergen Reactions    Ace Inhibitors Cough     Family History   Problem Relation Age of Onset    Heart Disease Father     Cancer Father     Diabetes Brother     Diabetes Maternal Grandmother     No Known Problems Mother     Hypertension Paternal Grandmother      Social History     Tobacco Use    Smoking status: Former Smoker     Years: 56.00     Types: Cigarettes     Last attempt to quit: 2006     Years since quittin.9    Smokeless tobacco: Never Used   Substance Use Topics    Alcohol use: No     Comment: rare     Patient Active Problem List   Diagnosis Code    [facility] J44.9    GERD (gastroesophageal reflux disease) K21.9    CAD (coronary artery disease) I25.10    Diabetes mellitus type 2, controlled, without complications ([facility] L16.9    Hyperlipidemia E78.5    Chronic low back pain M54.5, G89.29    Oxygen dependent Z99.81    Advanced care planning/counseling discussion Z71.89    Type 2 diabetes with nephropathy (HCC) E11.21    Hypertension I10    Chronic obstructive pulmonary disease (HCC) J44.9       Depression Risk Factor Screening:     3 most recent PHQ Screens 10/18/2018   Little interest or pleasure in doing things Not at all   Feeling down, depressed, irritable, or hopeless Not at all   Total Score PHQ 2 0     Alcohol Risk Factor Screening: On any occasion in the past three months you have had more than 7 drinks containing alcohol    Functional Ability and Level of Safety:   Hearing Loss  Hearing is good. Activities of Daily Living  The home contains: grab bars and pull cords. Lives in a senior complex  Patient needs help with transportation. Does everything else      Fall Risk  Fall Risk Assessment, last 12 mths 10/18/2018   Able to walk? Yes   Fall in past 12 months? No       Abuse Screen  Patient is not abused    Cognitive Screening   Evaluation of Cognitive Function:  Has your family/caregiver stated any concerns about your memory: no  Normal, Mini Cog test    Patient Care Team   Patient Care Team:  Jaime Epley, MD as PCP - General (Internal Medicine)  Barry Malik MD (Gastroenterology)  Domenica Wylie RN as Ambulatory Care Navigator  Erica Bunch MD as Consulting Provider (Cardiology)  Saniya Willis MD as Consulting Provider (Pulmonary Disease)  María Maza MD as Consulting Provider (Optometry)  Butch Bhardwaj MD (Pulmonary Disease)    Assessment/Plan   Education and counseling provided:  Are appropriate based on today's review and evaluation    Diagnoses and all orders for this visit:    1.  Medicare annual wellness visit, subsequent        Health Maintenance Due   Topic Date Due    FOOT EXAM Q1  06/18/2019    MEDICARE YEARLY EXAM  06/19/2019

## 2019-07-10 LAB
ALBUMIN SERPL-MCNC: 4.2 G/DL (ref 3.5–4.8)
ALBUMIN/GLOB SERPL: 1.4 {RATIO} (ref 1.2–2.2)
ALP SERPL-CCNC: 78 IU/L (ref 39–117)
ALT SERPL-CCNC: 13 IU/L (ref 0–44)
AST SERPL-CCNC: 21 IU/L (ref 0–40)
BILIRUB SERPL-MCNC: 0.3 MG/DL (ref 0–1.2)
BUN SERPL-MCNC: 15 MG/DL (ref 8–27)
BUN/CREAT SERPL: 25 (ref 10–24)
CALCIUM SERPL-MCNC: 9.7 MG/DL (ref 8.6–10.2)
CHLORIDE SERPL-SCNC: 99 MMOL/L (ref 96–106)
CHOLEST SERPL-MCNC: 156 MG/DL (ref 100–199)
CO2 SERPL-SCNC: 29 MMOL/L (ref 20–29)
CREAT SERPL-MCNC: 0.59 MG/DL (ref 0.76–1.27)
GLOBULIN SER CALC-MCNC: 2.9 G/DL (ref 1.5–4.5)
GLUCOSE SERPL-MCNC: 112 MG/DL (ref 65–99)
HBA1C MFR BLD: 6.7 % (ref 4.8–5.6)
HDLC SERPL-MCNC: 52 MG/DL
INTERPRETATION, 910389: NORMAL
LDLC SERPL CALC-MCNC: 82 MG/DL (ref 0–99)
POTASSIUM SERPL-SCNC: 4.1 MMOL/L (ref 3.5–5.2)
PROT SERPL-MCNC: 7.1 G/DL (ref 6–8.5)
SODIUM SERPL-SCNC: 142 MMOL/L (ref 134–144)
TRIGL SERPL-MCNC: 112 MG/DL (ref 0–149)
VLDLC SERPL CALC-MCNC: 22 MG/DL (ref 5–40)

## 2019-07-31 RX ORDER — IPRATROPIUM BROMIDE AND ALBUTEROL SULFATE 2.5; .5 MG/3ML; MG/3ML
SOLUTION RESPIRATORY (INHALATION)
Qty: 90 ML | Refills: 5 | Status: SHIPPED | OUTPATIENT
Start: 2019-07-31 | End: 2020-03-27

## 2019-08-11 RX ORDER — METFORMIN HYDROCHLORIDE 500 MG/1
TABLET ORAL
Qty: 180 TAB | Refills: 5 | Status: SHIPPED | OUTPATIENT
Start: 2019-08-11 | End: 2020-02-12 | Stop reason: SDUPTHER

## 2019-09-27 ENCOUNTER — PATIENT OUTREACH (OUTPATIENT)
Dept: INTERNAL MEDICINE CLINIC | Age: 74
End: 2019-09-27

## 2019-09-27 NOTE — PROGRESS NOTES
Hospital Discharge Follow-Up      Date/Time:  2019 1:54 PM    Patient was admitted to [facility] on 19 and discharged on 19 for Chest Pain. The physician discharge summary was available at the time of outreach. Patient was contacted within 1 business day of discharge. Top Challenges reviewed with the provider   None noted at this time. Method of communication with provider :none    Inpatient RRAT score: unavailable  Was this a readmission? no   Patient stated reason for the readmission: n/a    Nurse Navigator (NN) contacted the patient by telephone to perform post hospital discharge assessment. Verified name and  with patient as identifiers. Provided introduction to self, and explanation of the Nurse Navigator role. Patient states that he is doing well. Patient denied chest pain, shortness of breath, fever and chills. Patient states that he is currently in the car. Patient kept the conversation short. Patient states that he will self schedule appointment with Dr. Tom Cordero.   Unable to ask further questions as Pt. Kept the conversation short. Reviewed discharge instructions and red flags with patient who verbalized understanding. Patient given an opportunity to ask questions and does not have any further questions or concerns at this time. The patient agrees to contact the PCP office for questions related to their healthcare. NN provided contact information for future reference. Disease Specific:   N/A    Summary of patient's top problems:  1. CP- Stable at this time as per Pt. Home Health orders at discharge: none noted  [address]: n/a  Date of initial visit: [facility] ordered/company: n/a  Durable Medical Equipment received: n/a    Goals      Attends follow-up appointments as directed. Patient will call Dr. Tom Cordero office to schedule follow up appointment.

## 2019-10-15 ENCOUNTER — OFFICE VISIT (OUTPATIENT)
Dept: INTERNAL MEDICINE CLINIC | Age: 74
End: 2019-10-15

## 2019-10-15 VITALS
RESPIRATION RATE: 22 BRPM | WEIGHT: 189 LBS | DIASTOLIC BLOOD PRESSURE: 70 MMHG | BODY MASS INDEX: 28.64 KG/M2 | SYSTOLIC BLOOD PRESSURE: 120 MMHG | HEART RATE: 83 BPM | TEMPERATURE: 98.5 F | HEIGHT: 68 IN | OXYGEN SATURATION: 93 %

## 2019-10-15 DIAGNOSIS — R07.89 CHEST DISCOMFORT: Primary | ICD-10-CM

## 2019-10-15 DIAGNOSIS — K21.9 GASTROESOPHAGEAL REFLUX DISEASE, ESOPHAGITIS PRESENCE NOT SPECIFIED: ICD-10-CM

## 2019-10-15 NOTE — PROGRESS NOTES
HISTORY OF PRESENT ILLNESS  Tianna Mesa is a 69 y.o. male. Visit Vitals  /70 (BP 1 Location: Left arm, BP Patient Position: Sitting)   Pulse 83   Temp 98.5 °F (36.9 °C) (Oral)   Resp 22   Ht 5' 8\" (1.727 m)   Wt 189 lb (85.7 kg)   SpO2 93%   BMI 28.74 kg/m²       He has presented to the ER with burning on the left side of his chest.  Stayed 9/24-9/26/19 at [facility]  He has h/o heart disease  He also has lung cancer in the left apex. But he thinks it was his GERD  He gets it every now and then. When he eat something that does not agree with him. Other   The history is provided by the patient (was at the ER with chest pain. He had a stress test that was negative for ischemia). This is a new problem. Pertinent negatives include no chest pain, no headaches and no shortness of breath. Review of Systems   Constitutional: Negative for chills and fever. Respiratory: Positive for sputum production (occasional phlegm). Negative for shortness of breath. Cardiovascular: Negative for chest pain and palpitations. Neurological: Negative for dizziness and headaches. Physical Exam   Constitutional: He is oriented to person, place, and time. He appears well-developed and well-nourished. No distress. HENT:   Right Ear: External ear normal.   Left Ear: External ear normal.   Mouth/Throat: Oropharynx is clear and moist.   Cardiovascular: Normal rate and regular rhythm. Pulmonary/Chest: Effort normal and breath sounds normal. No respiratory distress. Musculoskeletal: He exhibits no edema. Neurological: He is alert and oriented to person, place, and time. Skin: Skin is warm and dry. He is not diaphoretic. Psychiatric: He has a normal mood and affect. Nursing note and vitals reviewed. ASSESSMENT and PLAN    ICD-10-CM ICD-9-CM    1. Chest discomfort R07.89 786.59    2.  Gastroesophageal reflux disease, esophagitis presence not specified K21.9 530.81        Available hospital/ER record reviewed    I suspect he is right that this was GERD  Will add prn Pepcid complete. Doing well    Flagged that pt is overweight.  He is not much overweight and the extra reserve is important for him and his underlying health (cancer)    F/u as appointed

## 2019-10-15 NOTE — PROGRESS NOTES
[address] presents today for   Chief Complaint   Patient presents with    Other     ER follow up        Danette Jackson preferred language for health care discussion is english/other. Is someone accompanying this pt? no    Is the patient using any DME equipment during [address]? rollator    Depression Screening:  3 most recent [facility] Screens 10/18/2018 6/18/2018 9/19/2017 7/25/2017 6/8/2017 5/3/2017 2/8/2017   Little interest or pleasure in doing things Not at all Not at all Not at all Not at all Not at all Not at all Not at all   Feeling down, depressed, irritable, or hopeless Not at all Not at all Not at all Not at all Not at all Not at all Not at all   Total Score PHQ 2 0 0 0 0 0 0 0       Learning Assessment:  Learning Assessment 8/5/2014   PRIMARY LEARNER Patient   HIGHEST LEVEL OF EDUCATION - PRIMARY LEARNER  GRADUATED Alexi Dunlap 95 PRIMARY LEARNER NONE   CO-LEARNER CAREGIVER No   PRIMARY LANGUAGE ENGLISH   LEARNER PREFERENCE PRIMARY DEMONSTRATION     LISTENING     READING   ANSWERED BY patient   RELATIONSHIP SELF       Abuse Screening:  Abuse Screening Questionnaire 7/9/2019 9/19/2017 3/16/2016 2/18/2016 1/21/2016 11/27/2015 10/30/2015   Do you ever feel afraid of your partner? N N N N N N N   Are you in a relationship with someone who physically or mentally threatens you? N N N N N N N   Is it safe for you to go home? [place]       Fall Risk  Fall Risk Assessment, last 12 mths 10/18/2018 6/18/2018 9/19/2017 7/25/2017 6/8/2017 5/3/2017 2/8/2017   Able to walk? Yes Yes Yes Yes Yes Yes Yes   Fall in past 12 months? No No No No No No No           Health Maintenance reviewed and discussed per provider. Yes    Danette Jackson is due for There are no preventive care reminders to display for this patient. Please order/place referral if appropriate. Advance Directive:  1. Do you have an advance directive in place? Patient Reply: no    2.  If not, would you like material regarding how to put one in place? Patient Reply: no    Coordination of Care:  1. Have you been to the ER, urgent care clinic since your last visit? Hospitalized since your last visit? yes    2. Have you seen or consulted any other health care providers outside of the [address] [place] [address] since your last visit? Include any pap smears or colon screening.  no

## 2019-10-21 DIAGNOSIS — Z76.0 MEDICATION REFILL: ICD-10-CM

## 2019-10-21 RX ORDER — ALBUTEROL SULFATE 90 UG/1
AEROSOL, METERED RESPIRATORY (INHALATION)
Qty: 54 G | Refills: 11 | Status: SHIPPED | OUTPATIENT
Start: 2019-10-21 | End: 2020-11-30

## 2019-12-01 RX ORDER — SIMVASTATIN 20 MG/1
TABLET, FILM COATED ORAL
Qty: 90 TAB | Refills: 4 | Status: SHIPPED | OUTPATIENT
Start: 2019-12-01 | End: 2020-11-18

## 2019-12-02 ENCOUNTER — OFFICE VISIT (OUTPATIENT)
Dept: INTERNAL MEDICINE CLINIC | Age: 74
End: 2019-12-02

## 2019-12-02 VITALS
BODY MASS INDEX: 28.95 KG/M2 | TEMPERATURE: 98.8 F | SYSTOLIC BLOOD PRESSURE: 130 MMHG | OXYGEN SATURATION: 90 % | HEIGHT: 68 IN | HEART RATE: 92 BPM | WEIGHT: 191 LBS | RESPIRATION RATE: 22 BRPM | DIASTOLIC BLOOD PRESSURE: 78 MMHG

## 2019-12-02 DIAGNOSIS — E78.5 HYPERLIPIDEMIA, UNSPECIFIED HYPERLIPIDEMIA TYPE: Chronic | ICD-10-CM

## 2019-12-02 DIAGNOSIS — I10 ESSENTIAL HYPERTENSION: ICD-10-CM

## 2019-12-02 DIAGNOSIS — J44.9 CHRONIC OBSTRUCTIVE PULMONARY DISEASE, UNSPECIFIED COPD TYPE (HCC): ICD-10-CM

## 2019-12-02 DIAGNOSIS — G56.02 CARPAL TUNNEL SYNDROME OF LEFT WRIST: ICD-10-CM

## 2019-12-02 DIAGNOSIS — E11.21 TYPE 2 DIABETES WITH NEPHROPATHY (HCC): Primary | ICD-10-CM

## 2019-12-02 RX ORDER — AZITHROMYCIN 250 MG/1
250 TABLET, FILM COATED ORAL
COMMUNITY
Start: 2019-10-30 | End: 2021-05-18 | Stop reason: SDUPTHER

## 2019-12-02 NOTE — PROGRESS NOTES
[address] presents today for   Chief Complaint   Patient presents with    Hypertension    Diabetes    Cholesterol Problem       Don Vizibility preferred language for health care discussion is english/other. Is someone accompanying this pt? no    Is the patient using any DME equipment during [address]? Rollator     Depression Screening:  3 most recent [facility] Screens 10/18/2018 6/18/2018 9/19/2017 7/25/2017 6/8/2017 5/3/2017 2/8/2017   Little interest or pleasure in doing things Not at all Not at all Not at all Not at all Not at all Not at all Not at all   Feeling down, depressed, irritable, or hopeless Not at all Not at all Not at all Not at all Not at all Not at all Not at all   Total Score PHQ 2 0 0 0 0 0 0 0       Learning Assessment:  Learning Assessment 8/5/2014   PRIMARY LEARNER Patient   HIGHEST LEVEL OF EDUCATION - PRIMARY LEARNER  GRADUATED Mariog Leroyucfederica 95 PRIMARY LEARNER NONE   CO-LEARNER CAREGIVER No   PRIMARY LANGUAGE ENGLISH   LEARNER PREFERENCE PRIMARY DEMONSTRATION     LISTENING     READING   ANSWERED BY patient   RELATIONSHIP SELF       Abuse Screening:  Abuse Screening Questionnaire 7/9/2019 9/19/2017 3/16/2016 2/18/2016 1/21/2016 11/27/2015 10/30/2015   Do you ever feel afraid of your partner? N N N N N N N   Are you in a relationship with someone who physically or mentally threatens you? N N N N N N N   Is it safe for you to go home? Meenu PEARCE       Fall Risk  Fall Risk Assessment, last 12 mths 10/18/2018 6/18/2018 9/19/2017 7/25/2017 6/8/2017 5/3/2017 2/8/2017   Able to walk? Yes Yes Yes Yes Yes Yes Yes   Fall in past 12 months? No No No No No No No           Health Maintenance reviewed and discussed per provider. Yes    LV Sensors is due for There are no preventive care reminders to display for this patient. Please order/place referral if appropriate. Advance Directive:  1. Do you have an advance directive in place? Patient Reply: no    2.  If not, would you like material regarding how to put one in place? Patient Reply: no    Coordination of Care:  1. Have you been to the ER, urgent care clinic since your last visit? Hospitalized since your last visit? no    2. Have you seen or consulted any other health care providers outside of the [address] [place] [address] since your last visit? Include any pap smears or colon screening.  no

## 2019-12-02 NOTE — PROGRESS NOTES
HISTORY OF PRESENT ILLNESS  Ann Vázquez is a 69 y.o. male. Visit Vitals  /78 (BP 1 Location: Left arm, BP Patient Position: Sitting)   Pulse 92   Temp 98.8 °F (37.1 °C) (Oral)   Resp 22   Ht 5' 8\" (1.727 m)   Wt 191 lb (86.6 kg)   SpO2 90%   BMI 29.04 kg/m²           Current Outpatient Medications:  azithromycin (ZITHROMAX) 250 mg tablet, Take 250 mg by mouth. simvastatin (ZOCOR) 20 mg tablet, TAKE 1 TABLET BY MOUTH EVERY EVENING  VENTOLIN HFA 90 mcg/actuation inhaler, USE 2 PUFFS EVERY FOUR HOURS  metFORMIN (GLUCOPHAGE) 500 mg tablet, TAKE 1 TABLET BY MOUTH TWO (2) TIMES DAILY (WITH MEALS). albuterol-ipratropium (DUO-NEB) 2.5 mg-0.5 mg/3 ml nebu, USE 1 VIAL HAND HELD NEBULIZER EVERY SIX HOURS AS NEEDED  losartan (COZAAR) 50 mg tablet, TAKE 1 TABLET BY MOUTH EVERY DAY  pantoprazole (PROTONIX) 40 mg tablet, TAKE 1 TABLET BY MOUTH EVERY DAY  roflumilast (DALIRESP) 500 mcg tab tablet, Take 500 mcg by mouth. ADVAIR DISKUS 500-50 mcg/dose diskus inhaler, USE 1 PUFF EVERY 12 HOURS  Blood-Glucose Meter (ACCU-CHEK SHERRI) misc, Use to test blood sugar daily or as directed  glucose blood VI test strips (ACCU-CHEK SMARTVIEW TEST STRIP) strip, Use to test blood sugar daily  Blood Glucose Control, Normal (ACCU-CHEK SMARTVIEW CONTRL SOL) soln, Use to test meter with each new bottle of test strips  Lancets (ACCU-CHEK FASTCLIX) misc, Use to test blood sugar daily  alcohol swabs (BD SINGLE USE SWABS REGULAR) pad, Use as directed to test blood sugar  SPIRIVA WITH HANDIHALER 18 mcg inhalation capsule, USE 1 CAPSULE VIA HANDIHALER ONCE A DAY FOR INHALATION  clotrimazole-betamethasone (LOTRISONE) topical cream, Apply to affected area BID for 2 weeks. albuterol (PROVENTIL VENTOLIN) 2.5 mg /3 mL (0.083 %) nebulizer solution, 3 mL by Nebulization route every four (4) hours as needed for Wheezing.  Dispense 100 unit dose vials  bumetanide (BUMEX) 1 mg tablet, Take one tablet weekly  metoprolol succinate (TOPROL-XL) 25 mg XL tablet, Take 1 tablet by mouth daily. Aspirin, Buffered 81 mg tab, Take  by mouth.  potassium chloride SR (KLOR-CON 10) 10 mEq tablet, Take 20 mEq by mouth two (2) times a day. Hypertension    The history is provided by the patient. This is a chronic problem. The current episode started more than 1 week ago. The problem has not changed since onset. Pertinent negatives include no chest pain and no palpitations. Diabetes   The history is provided by the patient. This is a chronic problem. The current episode started more than 1 week ago. The problem occurs daily. Pertinent negatives include no chest pain. Exacerbated by: diet. The symptoms are relieved by medications (diet). Review of Systems   Constitutional: Negative. Respiratory: Positive for cough. Cardiovascular: Negative for chest pain and palpitations. Genitourinary: Negative for frequency and urgency. Endo/Heme/Allergies: Negative for polydipsia. Physical Exam  Vitals signs and nursing note reviewed. Constitutional:       General: He is not in acute distress. Appearance: He is well-developed. He is not diaphoretic. Cardiovascular:      Rate and Rhythm: Normal rate and regular rhythm. Pulmonary:      Effort: Pulmonary effort is normal.      Breath sounds: Normal breath sounds. Comments: Breath sounds diminished overall. Musculoskeletal:      Comments: +Tinel's on the left wrist   Skin:     General: Skin is warm and dry. Neurological:      Mental Status: He is alert and oriented to person, place, and time. ASSESSMENT and PLAN    ICD-10-CM ICD-9-CM    1. Type 2 diabetes with nephropathy (HCC) D02.78 906.61 METABOLIC PANEL, COMPREHENSIVE     583.81 HEMOGLOBIN A1C W/O EAG      MICROALBUMIN, UR, RAND W/ MICROALB/CREAT RATIO   2. Hyperlipidemia, unspecified hyperlipidemia type E78.5 272.4 LIPID PANEL   3. Essential hypertension Q87 039.5 METABOLIC PANEL, COMPREHENSIVE   4.  Chronic obstructive pulmonary disease, unspecified COPD type ([facility] J44.9 496    5.  Carpal tunnel syndrome of left wrist G56.02 354.0 REFERRAL TO ORTHOPEDICS       BP controlled    BS has been controlled    Refer to ortho for left CTS    Update lab    F/u 4 months

## 2019-12-03 LAB
ALBUMIN SERPL-MCNC: 4.4 G/DL (ref 3.5–4.8)
ALBUMIN/CREAT UR: 27.5 MG/G CREAT (ref 0–30)
ALBUMIN/GLOB SERPL: 1.7 {RATIO} (ref 1.2–2.2)
ALP SERPL-CCNC: 91 IU/L (ref 39–117)
ALT SERPL-CCNC: 12 IU/L (ref 0–44)
AST SERPL-CCNC: 17 IU/L (ref 0–40)
BILIRUB SERPL-MCNC: 0.3 MG/DL (ref 0–1.2)
BUN SERPL-MCNC: 13 MG/DL (ref 8–27)
BUN/CREAT SERPL: 24 (ref 10–24)
CALCIUM SERPL-MCNC: 9.4 MG/DL (ref 8.6–10.2)
CHLORIDE SERPL-SCNC: 98 MMOL/L (ref 96–106)
CHOLEST SERPL-MCNC: 167 MG/DL (ref 100–199)
CO2 SERPL-SCNC: 27 MMOL/L (ref 20–29)
CREAT SERPL-MCNC: 0.55 MG/DL (ref 0.76–1.27)
CREAT UR-MCNC: 66.3 MG/DL
GLOBULIN SER CALC-MCNC: 2.6 G/DL (ref 1.5–4.5)
GLUCOSE SERPL-MCNC: 114 MG/DL (ref 65–99)
HBA1C MFR BLD: 6.5 % (ref 4.8–5.6)
HDLC SERPL-MCNC: 54 MG/DL
INTERPRETATION, 910389: NORMAL
LDLC SERPL CALC-MCNC: 93 MG/DL (ref 0–99)
MICROALBUMIN UR-MCNC: 18.2 UG/ML
POTASSIUM SERPL-SCNC: 4.7 MMOL/L (ref 3.5–5.2)
PROT SERPL-MCNC: 7 G/DL (ref 6–8.5)
SODIUM SERPL-SCNC: 142 MMOL/L (ref 134–144)
TRIGL SERPL-MCNC: 101 MG/DL (ref 0–149)
VLDLC SERPL CALC-MCNC: 20 MG/DL (ref 5–40)

## 2020-02-12 ENCOUNTER — OFFICE VISIT (OUTPATIENT)
Dept: INTERNAL MEDICINE CLINIC | Age: 75
End: 2020-02-12

## 2020-02-12 VITALS
OXYGEN SATURATION: 96 % | WEIGHT: 187 LBS | TEMPERATURE: 96.9 F | BODY MASS INDEX: 28.34 KG/M2 | DIASTOLIC BLOOD PRESSURE: 65 MMHG | HEART RATE: 65 BPM | RESPIRATION RATE: 22 BRPM | SYSTOLIC BLOOD PRESSURE: 118 MMHG | HEIGHT: 68 IN

## 2020-02-12 DIAGNOSIS — Z76.0 MEDICATION REFILL: ICD-10-CM

## 2020-02-12 DIAGNOSIS — R05.9 COUGH: ICD-10-CM

## 2020-02-12 DIAGNOSIS — K21.9 GASTROESOPHAGEAL REFLUX DISEASE, ESOPHAGITIS PRESENCE NOT SPECIFIED: Primary | ICD-10-CM

## 2020-02-12 DIAGNOSIS — J44.9 CHRONIC OBSTRUCTIVE PULMONARY DISEASE, UNSPECIFIED COPD TYPE (HCC): ICD-10-CM

## 2020-02-12 RX ORDER — CODEINE PHOSPHATE AND GUAIFENESIN 10; 100 MG/5ML; MG/5ML
5 SOLUTION ORAL
Qty: 180 ML | Refills: 0 | Status: SHIPPED | OUTPATIENT
Start: 2020-02-12 | End: 2020-02-26

## 2020-02-12 RX ORDER — PANTOPRAZOLE SODIUM 40 MG/1
TABLET, DELAYED RELEASE ORAL
Qty: 90 TAB | Refills: 3 | Status: SHIPPED | OUTPATIENT
Start: 2020-02-12 | End: 2021-01-29

## 2020-02-12 RX ORDER — METFORMIN HYDROCHLORIDE 500 MG/1
TABLET ORAL
Qty: 180 TAB | Refills: 5 | Status: SHIPPED | OUTPATIENT
Start: 2020-02-12 | End: 2021-04-20

## 2020-02-12 NOTE — PROGRESS NOTES
[address] presents today for No chief complaint on file. Karlee Chambers preferred language for health care discussion is english/other. Is someone accompanying this pt? no    Is the patient using any DME equipment during [address]? rollator    Depression Screening:  3 most recent [facility] Screens 10/18/2018 6/18/2018 9/19/2017 7/25/2017 6/8/2017 5/3/2017 2/8/2017   Little interest or pleasure in doing things Not at all Not at all Not at all Not at all Not at all Not at all Not at all   Feeling down, depressed, irritable, or hopeless Not at all Not at all Not at all Not at all Not at all Not at all Not at all   Total Score PHQ 2 0 0 0 0 0 0 0       Learning Assessment:  Learning Assessment 8/5/2014   PRIMARY LEARNER Patient   HIGHEST LEVEL OF EDUCATION - PRIMARY LEARNER  GRADUATED [address] PRIMARY LEARNER NONE   CO-LEARNER CAREGIVER No   PRIMARY LANGUAGE ENGLISH   LEARNER PREFERENCE PRIMARY DEMONSTRATION     LISTENING     READING   ANSWERED BY patient   RELATIONSHIP SELF       Abuse Screening:  Abuse Screening Questionnaire 7/9/2019 9/19/2017 3/16/2016 2/18/2016 1/21/2016 11/27/2015 10/30/2015   Do you ever feel afraid of your partner? N N N N N N N   Are you in a relationship with someone who physically or mentally threatens you? N N N N N N N   Is it safe for you to go home? Mindy PEARCE       Fall Risk  Fall Risk Assessment, last 12 mths 10/18/2018 6/18/2018 9/19/2017 7/25/2017 6/8/2017 5/3/2017 2/8/2017   Able to walk? Yes Yes Yes Yes Yes Yes Yes   Fall in past 12 months? No No No No No No No           Health Maintenance reviewed and discussed per provider. Yes    Karlee Chambers is due for There are no preventive care reminders to display for this patient. Please order/place referral if appropriate. Advance Directive:  1. Do you have an advance directive in place? Patient Reply: no    2. If not, would you like material regarding how to put one in place?  Patient Reply: no    Coordination of Care:  1. Have you been to the ER, urgent care clinic since your last visit? Hospitalized since your last visit? yes    2. Have you seen or consulted any other health care providers outside of the [address] [place] [address] since your last visit? Include any pap smears or colon screening.  Dr. Judithann Cockayne

## 2020-02-12 NOTE — PROGRESS NOTES
HISTORY OF PRESENT ILLNESS  Karlee Chambers is a 69 y.o. male. /65 (BP 1 Location: Left arm, BP Patient Position: Sitting)   Pulse 65   Temp 96.9 °F (36.1 °C) (Oral)   Resp 22   Ht 5' 8\" (1.727 m)   Wt 187 lb (84.8 kg)   SpO2 96%   BMI 28.43 kg/m²     Was seen at [facility] Sunday night after eating some chicken he laid down about a half hour after eating and started to have difficulty breathing, about an hour after eating. He drank some water which helped but reports he still felt something in his throat, so he called his neighbor and went to the ER. He reports he received Mylanta/lidocaine and decadron and a Duoneb treatment and felt better so he was discharged home. Other   The history is provided by the patient (see comments). This is a new problem. The problem has been resolved. Pertinent negatives include no chest pain and no shortness of breath. Relieved by: ER treatment with GI cocktail and duoneb. Review of Systems   Constitutional: Negative for chills and fever. HENT:        Tightness in  Throat as if something was blocking his throat, water helped    Respiratory: Negative for cough and shortness of breath. Cardiovascular: Negative for chest pain and palpitations. Gastrointestinal: Positive for heartburn. Physical Exam  Vitals signs and nursing note reviewed. Constitutional:       General: He is not in acute distress. Appearance: He is well-developed. He is not diaphoretic. Cardiovascular:      Rate and Rhythm: Normal rate and regular rhythm. Pulmonary:      Effort: Pulmonary effort is normal.      Breath sounds: Normal breath sounds. Skin:     General: Skin is warm and dry. Neurological:      Mental Status: He is alert and oriented to person, place, and time. ASSESSMENT and PLAN    ICD-10-CM ICD-9-CM    1. Gastroesophageal reflux disease, esophagitis presence not specified K21.9 530.81    2.  Chronic obstructive pulmonary disease, unspecified COPD type ([facility] J44.9 496 guaiFENesin-codeine (ROBITUSSIN AC) 100-10 mg/5 mL solution   3. Cough R05 786.2 guaiFENesin-codeine (ROBITUSSIN AC) 100-10 mg/5 mL solution   4.  Medication refill Z76.0 V68.1 pantoprazole (PROTONIX) 40 mg tablet      metFORMIN (GLUCOPHAGE) 500 mg tablet      guaiFENesin-codeine (ROBITUSSIN AC) 100-10 mg/5 mL solution     Available hospital/ER record reviewed  I suspect this was esophageal spasm  The ER doctor's note is not available yet   OK to use mylanta should it happen again    Has upcoming appointment with orthopedic for carpel tunnel     Med refills     He looks good today and reports resolved complaints     F/U as appointed in April

## 2020-03-27 RX ORDER — IPRATROPIUM BROMIDE AND ALBUTEROL SULFATE 2.5; .5 MG/3ML; MG/3ML
SOLUTION RESPIRATORY (INHALATION)
Qty: 90 ML | Refills: 4 | Status: SHIPPED | OUTPATIENT
Start: 2020-03-27 | End: 2020-12-22

## 2020-05-14 DIAGNOSIS — Z76.0 MEDICATION REFILL: ICD-10-CM

## 2020-05-14 RX ORDER — FLUTICASONE PROPIONATE AND SALMETEROL 500; 50 UG/1; UG/1
POWDER RESPIRATORY (INHALATION)
Qty: 180 EACH | Refills: 10 | Status: SHIPPED | OUTPATIENT
Start: 2020-05-14 | End: 2020-08-05 | Stop reason: SDUPTHER

## 2020-05-26 RX ORDER — LOSARTAN POTASSIUM 50 MG/1
TABLET ORAL
Qty: 90 TAB | Refills: 2 | Status: SHIPPED | OUTPATIENT
Start: 2020-05-26 | End: 2020-11-18

## 2020-07-09 ENCOUNTER — OFFICE VISIT (OUTPATIENT)
Dept: INTERNAL MEDICINE CLINIC | Age: 75
End: 2020-07-09

## 2020-07-09 VITALS
OXYGEN SATURATION: 91 % | SYSTOLIC BLOOD PRESSURE: 122 MMHG | RESPIRATION RATE: 22 BRPM | TEMPERATURE: 97.1 F | WEIGHT: 186 LBS | HEART RATE: 94 BPM | HEIGHT: 68 IN | BODY MASS INDEX: 28.19 KG/M2 | DIASTOLIC BLOOD PRESSURE: 75 MMHG

## 2020-07-09 DIAGNOSIS — J44.9 CHRONIC OBSTRUCTIVE PULMONARY DISEASE, UNSPECIFIED COPD TYPE (HCC): ICD-10-CM

## 2020-07-09 DIAGNOSIS — E11.21 TYPE 2 DIABETES WITH NEPHROPATHY (HCC): ICD-10-CM

## 2020-07-09 DIAGNOSIS — E78.5 HYPERLIPIDEMIA, UNSPECIFIED HYPERLIPIDEMIA TYPE: ICD-10-CM

## 2020-07-09 DIAGNOSIS — Z01.818 PRE-OP EXAM: ICD-10-CM

## 2020-07-09 DIAGNOSIS — Z00.00 MEDICARE ANNUAL WELLNESS VISIT, SUBSEQUENT: Primary | ICD-10-CM

## 2020-07-09 DIAGNOSIS — I10 ESSENTIAL HYPERTENSION: ICD-10-CM

## 2020-07-09 DIAGNOSIS — R05.9 COUGH: ICD-10-CM

## 2020-07-09 RX ORDER — FLUTICASONE PROPIONATE AND SALMETEROL 250; 50 UG/1; UG/1
1 POWDER RESPIRATORY (INHALATION)
COMMUNITY
Start: 2020-07-07 | End: 2020-08-05

## 2020-07-09 NOTE — PROGRESS NOTES
[address] presents today for No chief complaint on file. Sheila Villafana preferred language for health care discussion is english/other. Is someone accompanying this pt? no    Is the patient using any DME equipment during [address]? rollator    Depression Screening:  3 most recent [facility] Screens 7/9/2020 2/12/2020 10/18/2018 6/18/2018 9/19/2017 7/25/2017 6/8/2017   Little interest or pleasure in doing things Not at all Not at all Not at all Not at all Not at all Not at all Not at all   Feeling down, depressed, irritable, or hopeless Not at all Not at all Not at all Not at all Not at all Not at all Not at all   Total Score PHQ 2 0 0 0 0 0 0 0       Learning Assessment:  Learning Assessment 8/5/2014   PRIMARY LEARNER Patient   HIGHEST LEVEL OF EDUCATION - PRIMARY LEARNER  GRADUATED [address] PRIMARY LEARNER NONE   CO-LEARNER CAREGIVER No   PRIMARY LANGUAGE ENGLISH   LEARNER PREFERENCE PRIMARY DEMONSTRATION     LISTENING     READING   ANSWERED BY patient   RELATIONSHIP SELF       Abuse Screening:  Abuse Screening Questionnaire 7/9/2019 9/19/2017 3/16/2016 2/18/2016 1/21/2016 11/27/2015 10/30/2015   Do you ever feel afraid of your partner? N N N N N N N   Are you in a relationship with someone who physically or mentally threatens you? N N N N N N N   Is it safe for you to go home? Doyce Dubin Y       Fall Risk  Fall Risk Assessment, last 12 mths 2/12/2020 10/18/2018 6/18/2018 9/19/2017 7/25/2017 6/8/2017 5/3/2017   Able to walk? Yes Yes Yes Yes Yes Yes Yes   Fall in past 12 months? No No No No No No No           Health Maintenance reviewed and discussed per provider.  Yes    Sheila Villafana is due for   Health Maintenance Due   Topic Date Due    DTaP/Tdap/Td series (1 - Tdap) 06/11/1968    Shingrix Vaccine Age 50> (1 of 2) 06/11/1997    GLAUCOMA SCREENING Q2Y  05/04/2020    Eye Exam Retinal or Dilated  05/04/2020    Foot Exam Q1  07/09/2020    Medicare Yearly Exam 07/09/2020     Please order/place referral if appropriate. Advance Directive:  1. Do you have an advance directive in place? Patient Reply: no    2. If not, would you like material regarding how to put one in place? Patient Reply: no    Coordination of Care:  1. Have you been to the ER, urgent care clinic since your last visit? Hospitalized since your last visit? no    2. Have you seen or consulted any other health care providers outside of the [address] [place] [address] since your last visit? Include any pap smears or colon screening.  no

## 2020-07-09 NOTE — PROGRESS NOTES
The following is a separate encounter visit:    HISTORY OF PRESENT ILLNESS  Rodger Romano is a 70 y.o. male. Visit Vitals  /75 (BP 1 Location: Right arm, BP Patient Position: Sitting)   Pulse 94   Temp 97.1 °F (36.2 °C) (Temporal)   Resp 22   Ht 5' 8\" (1.727 m)   Wt 186 lb (84.4 kg)   SpO2 91%   BMI 28.28 kg/m²                 Current Outpatient Medications:  fluticasone propion-salmeteroL (ADVAIR/WIXELA) 250-50 mcg/dose diskus inhaler, Take 1 Puff by inhalation. losartan (COZAAR) 50 mg tablet, TAKE 1 TABLET BY MOUTH EVERY DAY  fluticasone propion-salmeteroL (ADVAIR/WIXELA) 500-50 mcg/dose diskus inhaler, TAKE 1 PUFF INHALED BY MOUTH TWICE DAILY. RINSE YOU MOUTH.  albuterol-ipratropium (DUO-NEB) 2.5 mg-0.5 mg/3 ml nebu, USE 1 VIAL WITH HAND HELD NEBULIZER EVERY SIX HOURS AS NEEDED  pantoprazole (PROTONIX) 40 mg tablet, TAKE 1 TABLET BY MOUTH EVERY DAY  metFORMIN (GLUCOPHAGE) 500 mg tablet, TAKE 1 TABLET BY MOUTH TWO (2) TIMES DAILY (WITH MEALS). azithromycin (ZITHROMAX) 250 mg tablet, Take 250 mg by mouth. simvastatin (ZOCOR) 20 mg tablet, TAKE 1 TABLET BY MOUTH EVERY EVENING  VENTOLIN HFA 90 mcg/actuation inhaler, USE 2 PUFFS EVERY FOUR HOURS  roflumilast (DALIRESP) 500 mcg tab tablet, Take 500 mcg by mouth. Blood-Glucose Meter (ACCU-CHEK SHERRI) misc, Use to test blood sugar daily or as directed  glucose blood VI test strips (ACCU-CHEK SMARTVIEW TEST STRIP) strip, Use to test blood sugar daily  Blood Glucose Control, Normal (ACCU-CHEK SMARTVIEW CONTRL SOL) soln, Use to test meter with each new bottle of test strips  Lancets (ACCU-CHEK FASTCLIX) misc, Use to test blood sugar daily  alcohol swabs (BD SINGLE USE SWABS REGULAR) padm, Use as directed to test blood sugar  SPIRIVA WITH HANDIHALER 18 mcg inhalation capsule, USE 1 CAPSULE VIA HANDIHALER ONCE A DAY FOR INHALATION  clotrimazole-betamethasone (LOTRISONE) topical cream, Apply to affected area BID for 2 weeks.   albuterol (PROVENTIL VENTOLIN) 2.5 mg /3 mL (0.083 %) nebulizer solution, 3 mL by Nebulization route every four (4) hours as needed for Wheezing. Dispense 100 unit dose vials  bumetanide (BUMEX) 1 mg tablet, Take one tablet weekly  metoprolol succinate (TOPROL-XL) 25 mg XL tablet, Take 1 tablet by mouth daily. Aspirin, Buffered 81 mg tab, Take  by mouth.  potassium chloride SR (KLOR-CON 10) 10 mEq tablet, Take 20 mEq by mouth two (2) times a day. Diabetes   The history is provided by the patient. This is a chronic problem. The current episode started more than 1 week ago. The problem occurs daily. The problem has not changed since onset. Associated symptoms include shortness of breath (usual baseline). Pertinent negatives include no chest pain and no headaches. Exacerbated by: diet. The symptoms are relieved by medications (diet). Treatments tried: see med list.   Hypertension    The history is provided by the patient. This is a chronic problem. The current episode started more than 1 week ago. The problem has not changed since onset. Associated symptoms include shortness of breath (usual baseline). Pertinent negatives include no chest pain, no palpitations, no headaches and no dizziness. Risk factors include a sedentary lifestyle, male gender, diabetes mellitus and dyslipidemia. Review of Systems   Constitutional: Negative for chills and fever. HENT: Negative for congestion and sore throat. Respiratory: Positive for cough (intermitttent, at baseline) and shortness of breath (usual baseline). Cardiovascular: Negative for chest pain and palpitations. Neurological: Negative for dizziness and headaches. Physical Exam  Vitals signs and nursing note reviewed. Constitutional:       General: He is not in acute distress. Appearance: He is well-developed. He is not diaphoretic. HENT:      Head: Normocephalic and atraumatic.       Right Ear: Tympanic membrane, ear canal and external ear normal.      Left Ear: Tympanic membrane, ear canal and external ear normal.      Mouth/Throat:      Mouth: Mucous membranes are moist.   Cardiovascular:      Rate and Rhythm: Normal rate and regular rhythm. Pulmonary:      Effort: Pulmonary effort is normal.      Breath sounds: Normal breath sounds. Comments: Decreased breath sounds, but clear today--rare crackle  Musculoskeletal:      Right lower leg: No edema. Left lower leg: No edema. Skin:     General: Skin is warm and dry. Neurological:      Mental Status: He is alert and oriented to person, place, and time. Psychiatric:         Mood and Affect: Mood normal.         Behavior: Behavior normal.         ASSESSMENT and PLAN    ICD-10-CM ICD-9-CM           2. Type 2 diabetes with nephropathy ([facility])  R19.79 163.13 METABOLIC PANEL, COMPREHENSIVE     583.81 LIPID PANEL      HEMOGLOBIN A1C W/O EAG      MICROALBUMIN, UR, RAND W/ MICROALB/CREAT RATIO   3. Essential hypertension  Z14 538.1 METABOLIC PANEL, COMPREHENSIVE      LIPID PANEL      CBC WITH AUTOMATED DIFF   4. Hyperlipidemia, unspecified hyperlipidemia type  E78.5 272.4 LIPID PANEL   5. Cough  R05 786.2 CBC WITH AUTOMATED DIFF   6. Chronic obstructive pulmonary disease, unspecified COPD type ([facility])  J44.9 496 CBC WITH AUTOMATED DIFF   7. Pre-op exam  Z01.818 V72.84          BP controlled    COPD relatively quiet today    BS has been controlled    Update all lab    Reviewed chart and pre-op assessment made today for possible surgery on right CTS and cubital tunnel syndrome release  From general health perspective he is cleared for planned surgery  He does still need clearance from pulmonologist    F/u 4 months for HTN, DM          This is the Subsequent Medicare Annual Wellness Exam, performed 12 months or more after the Initial AWV or the last Subsequent AWV    I have reviewed the patient's medical history in detail and updated the computerized patient record.      History     Patient Active Problem List   Diagnosis Code    COPD (chronic obstructive pulmonary disease) ([facility]) J44.9    GERD (gastroesophageal reflux disease) K21.9    CAD (coronary artery disease) I25.10    Diabetes mellitus type 2, controlled, without complications ([facility]) Y79.0    Hyperlipidemia E78.5    Chronic low back pain M54.5, G89.29    Oxygen dependent Z99.81    Advanced care planning/counseling discussion Z71.89    Type 2 diabetes with nephropathy ([facility]) E11.21    Hypertension I10    Chronic obstructive pulmonary disease ([facility] J44.9     Past Medical History:   Diagnosis Date    Bunion of great toe of left foot     Bunion of great toe of right foot     CAD (coronary artery disease) 8/5/2014    Chronic obstructive pulmonary disease ([facility] 2006    CTS (carpal tunnel syndrome)     left    Diabetes ([facility] 2006    Diabetic retinopathy, nonproliferative, mild ([facility] 05/04/2018    GERD (gastroesophageal reflux disease) 8/5/2014    Hammertoe     Hyperlipidemia 8/5/2014    Hypertension     Mobility impaired     Oxygen dependent     Pulmonary nodules 03/24/2017    sees Dr. Sepideh Al SCC (squamous cell carcinoma of lung) ([facility] 08/22/2017    left apex    Stasis dermatitis       Past Surgical History:   Procedure Laterality Date    HX CORONARY ARTERY BYPASS GRAFT  8/6/2006    4 way bipass     Current Outpatient Medications   Medication Sig Dispense Refill    fluticasone propion-salmeteroL (ADVAIR/WIXELA) 250-50 mcg/dose diskus inhaler Take 1 Puff by inhalation.  losartan (COZAAR) 50 mg tablet TAKE 1 TABLET BY MOUTH EVERY DAY 90 Tab 2    fluticasone propion-salmeteroL (ADVAIR/WIXELA) 500-50 mcg/dose diskus inhaler TAKE 1 PUFF INHALED BY MOUTH TWICE DAILY.  RINSE YOU MOUTH. 180 Each 10    albuterol-ipratropium (DUO-NEB) 2.5 mg-0.5 mg/3 ml nebu USE 1 VIAL WITH HAND HELD NEBULIZER EVERY SIX HOURS AS NEEDED 90 mL 4    pantoprazole (PROTONIX) 40 mg tablet TAKE 1 TABLET BY MOUTH EVERY DAY 90 Tab 3    metFORMIN (GLUCOPHAGE) 500 mg tablet TAKE 1 TABLET BY MOUTH TWO (2) TIMES DAILY (WITH MEALS). 180 Tab 5    azithromycin (ZITHROMAX) 250 mg tablet Take 250 mg by mouth.  simvastatin (ZOCOR) 20 mg tablet TAKE 1 TABLET BY MOUTH EVERY EVENING 90 Tab 4    VENTOLIN HFA 90 mcg/actuation inhaler USE 2 PUFFS EVERY FOUR HOURS 54 g 11    roflumilast (DALIRESP) 500 mcg tab tablet Take 500 mcg by mouth.  Blood-Glucose Meter (ACCU-CHEK SHERRI) misc Use to test blood sugar daily or as directed 1 Each prn    glucose blood VI test strips (ACCU-CHEK SMARTVIEW TEST STRIP) strip Use to test blood sugar daily 100 Strip 4    Blood Glucose Control, Normal (ACCU-CHEK SMARTVIEW CONTRL SOL) soln Use to test meter with each new bottle of test strips 2 mL prn    Lancets (ACCU-CHEK FASTCLIX) misc Use to test blood sugar daily 100 Each 4    alcohol swabs (BD SINGLE USE SWABS REGULAR) padm Use as directed to test blood sugar 100 Pad 5    SPIRIVA WITH HANDIHALER 18 mcg inhalation capsule USE 1 CAPSULE VIA HANDIHALER ONCE A DAY FOR INHALATION 30 Cap 5    clotrimazole-betamethasone (LOTRISONE) topical cream Apply to affected area BID for 2 weeks. 30 g 11    albuterol (PROVENTIL VENTOLIN) 2.5 mg /3 mL (0.083 %) nebulizer solution 3 mL by Nebulization route every four (4) hours as needed for Wheezing. Dispense 100 unit dose vials 100 Each 3    bumetanide (BUMEX) 1 mg tablet Take one tablet weekly 12 Tab 3    metoprolol succinate (TOPROL-XL) 25 mg XL tablet Take 1 tablet by mouth daily. 90 tablet 3    Aspirin, Buffered 81 mg tab Take  by mouth.  potassium chloride SR (KLOR-CON 10) 10 mEq tablet Take 20 mEq by mouth two (2) times a day.        Allergies   Allergen Reactions    Ace Inhibitors Cough       Family History   Problem Relation Age of Onset    Heart Disease Father     Cancer Father     Diabetes Brother     Diabetes Maternal Grandmother     No Known Problems Mother     Hypertension Paternal Grandmother      Social History     Tobacco Use    Smoking status: Former Smoker     Years: 56.00     Types: Cigarettes     Last attempt to quit: 2006     Years since quittin.9    Smokeless tobacco: Never Used   Substance Use Topics    Alcohol use: No     Comment: rare       Depression Risk Factor Screening:     3 most recent PHQ Screens 2020   Little interest or pleasure in doing things Not at all   Feeling down, depressed, irritable, or hopeless Not at all   Total Score PHQ 2 0       Alcohol Risk Factor Screening (MALE > 65): Do you average more 1 drink per night or more than 7 drinks a week: No    In the past three months have you have had more than 4 drinks containing alcohol on one occasion: No      Functional Ability and Level of Safety:   Hearing: Hearing is good. Activities of Daily Living: The home contains: grab bars  And walkers  Patient needs help with:  walking     Ambulation: with difficulty, uses a walker     Fall Risk:  Fall Risk Assessment, last 12 mths 2020   Able to walk? Yes   Fall in past 12 months? No     Abuse Screen:  Patient is not abused       Cognitive Screening   Has your family/caregiver stated any concerns about your memory: no     Cognitive Screening: Normal - Animal Naming Test    Patient Care Team   Patient Care Team:  Annalisa Dinero MD as PCP - General (Internal Medicine)  Annalisa Dinero MD as PCP - [facility] Empaneled Provider  Elsi Grant MD (Gastroenterology)  Kashif Vann RN as Ambulatory Care Manager  Marcia Bunch MD as Consulting Provider (Cardiology)  Radha Ablerto MD as Consulting Provider (Pulmonary Disease)  Kalyn Mixon MD as Consulting Provider (Optometry)  Juan Tolbert MD (Pulmonary Disease)  Purnima Clark RN as Ambulatory Care Manager    Assessment/Plan   Education and counseling provided:  Are appropriate based on today's review and evaluation    Diagnoses and all orders for this visit:    1.  Medicare annual wellness visit, subsequent          Health Maintenance Due Topic Date Due    GLAUCOMA SCREENING Q2Y  05/04/2020    Eye Exam Retinal or Dilated  05/04/2020    Medicare Yearly Exam  07/09/2020

## 2020-07-09 NOTE — PATIENT INSTRUCTIONS
Medicare Wellness Visit, Male The best way to live healthy is to have a lifestyle where you eat a well-balanced diet, exercise regularly, limit alcohol use, and quit all forms of tobacco/nicotine, if applicable. Regular preventive services are another way to keep healthy. Preventive services (vaccines, screening tests, monitoring & exams) can help personalize your care plan, which helps you manage your own care. Screening tests can find health problems at the earliest stages, when they are easiest to treat. [facility] follows the current, evidence-based guidelines published by the [facility] ([facility]) when recommending preventive services for our patients. Because we follow these guidelines, sometimes recommendations change over time as research supports it. (For example, a prostate screening blood test is no longer routinely recommended for men with no symptoms). Of course, you and your doctor may decide to screen more often for some diseases, based on your risk and co-morbidities (chronic disease you are already diagnosed with). Preventive services for you include: - Medicare offers their members a free annual wellness visit, which is time for you and your primary care provider to discuss and plan for your preventive service needs. Take advantage of this benefit every year! 
-All adults over age 72 should receive the recommended pneumonia vaccines. Current USPSTF guidelines recommend a series of two vaccines for the best pneumonia protection.  
-All adults should have a flu vaccine yearly and tetanus vaccine every 10 years. 
-All adults age 48 and older should receive the shingles vaccines (series of two vaccines).       
-All adults age 38-68 who are overweight should have a diabetes screening test once every three years.  
-Other screening tests & preventive services for persons with diabetes include: an eye exam to screen for diabetic retinopathy, a kidney function test, a foot exam, and stricter control over your cholesterol.  
-Cardiovascular screening for adults with routine risk involves an electrocardiogram (ECG) at intervals determined by the provider.  
-Colorectal cancer screening should be done for adults age 54-65 with no increased risk factors for colorectal cancer. There are a number of acceptable methods of screening for this type of cancer. Each test has its own benefits and drawbacks. Discuss with your provider what is most appropriate for you during your annual wellness visit. The different tests include: colonoscopy (considered the best screening method), a fecal occult blood test, a fecal DNA test, and sigmoidoscopy. 
-All adults [place] between [facility] should be screened once for Hepatitis C. 
-An Abdominal Aortic Aneurysm (AAA) Screening is recommended for men age 73-68 who has ever smoked in their lifetime. Here is a list of your current Health Maintenance items (your personalized list of preventive services) with a due date: 
Health Maintenance Due Topic Date Due  Glaucoma Screening   05/04/2020 [facility] Eye Exam  05/04/2020 [facility] Annual Well Visit  07/09/2020

## 2020-07-10 LAB
ALBUMIN SERPL-MCNC: 4 G/DL (ref 3.7–4.7)
ALBUMIN/CREAT UR: 20 MG/G CREAT (ref 0–29)
ALBUMIN/GLOB SERPL: 1.4 {RATIO} (ref 1.2–2.2)
ALP SERPL-CCNC: 73 IU/L (ref 39–117)
ALT SERPL-CCNC: 16 IU/L (ref 0–44)
AST SERPL-CCNC: 22 IU/L (ref 0–40)
BASOPHILS # BLD AUTO: 0 X10E3/UL (ref 0–0.2)
BASOPHILS NFR BLD AUTO: 1 %
BILIRUB SERPL-MCNC: <0.2 MG/DL (ref 0–1.2)
BUN SERPL-MCNC: 11 MG/DL (ref 8–27)
BUN/CREAT SERPL: 17 (ref 10–24)
CALCIUM SERPL-MCNC: 9.3 MG/DL (ref 8.6–10.2)
CHLORIDE SERPL-SCNC: 100 MMOL/L (ref 96–106)
CHOLEST SERPL-MCNC: 144 MG/DL (ref 100–199)
CO2 SERPL-SCNC: 29 MMOL/L (ref 20–29)
CREAT SERPL-MCNC: 0.63 MG/DL (ref 0.76–1.27)
CREAT UR-MCNC: 76.2 MG/DL
EOSINOPHIL # BLD AUTO: 0.3 X10E3/UL (ref 0–0.4)
EOSINOPHIL NFR BLD AUTO: 4 %
ERYTHROCYTE [DISTWIDTH] IN BLOOD BY AUTOMATED COUNT: 15.5 % (ref 11.6–15.4)
GLOBULIN SER CALC-MCNC: 2.8 G/DL (ref 1.5–4.5)
GLUCOSE SERPL-MCNC: 91 MG/DL (ref 65–99)
HBA1C MFR BLD: 6.1 % (ref 4.8–5.6)
HCT VFR BLD AUTO: 37.6 % (ref 37.5–51)
HDLC SERPL-MCNC: 42 MG/DL
HGB BLD-MCNC: 12.4 G/DL (ref 13–17.7)
IMM GRANULOCYTES # BLD AUTO: 0 X10E3/UL (ref 0–0.1)
IMM GRANULOCYTES NFR BLD AUTO: 0 %
INTERPRETATION, 910389: NORMAL
LDLC SERPL CALC-MCNC: 61 MG/DL (ref 0–99)
LYMPHOCYTES # BLD AUTO: 2.6 X10E3/UL (ref 0.7–3.1)
LYMPHOCYTES NFR BLD AUTO: 41 %
MCH RBC QN AUTO: 29.8 PG (ref 26.6–33)
MCHC RBC AUTO-ENTMCNC: 33 G/DL (ref 31.5–35.7)
MCV RBC AUTO: 90 FL (ref 79–97)
MICROALBUMIN UR-MCNC: 15.4 UG/ML
MONOCYTES # BLD AUTO: 0.6 X10E3/UL (ref 0.1–0.9)
MONOCYTES NFR BLD AUTO: 9 %
NEUTROPHILS # BLD AUTO: 2.9 X10E3/UL (ref 1.4–7)
NEUTROPHILS NFR BLD AUTO: 45 %
PLATELET # BLD AUTO: 186 X10E3/UL (ref 150–450)
POTASSIUM SERPL-SCNC: 4.5 MMOL/L (ref 3.5–5.2)
PROT SERPL-MCNC: 6.8 G/DL (ref 6–8.5)
RBC # BLD AUTO: 4.16 X10E6/UL (ref 4.14–5.8)
SODIUM SERPL-SCNC: 143 MMOL/L (ref 134–144)
TRIGL SERPL-MCNC: 205 MG/DL (ref 0–149)
VLDLC SERPL CALC-MCNC: 41 MG/DL (ref 5–40)
WBC # BLD AUTO: 6.5 X10E3/UL (ref 3.4–10.8)

## 2020-08-05 ENCOUNTER — TELEPHONE (OUTPATIENT)
Dept: INTERNAL MEDICINE CLINIC | Age: 75
End: 2020-08-05

## 2020-08-05 DIAGNOSIS — Z76.0 MEDICATION REFILL: ICD-10-CM

## 2020-08-05 RX ORDER — FLUTICASONE PROPIONATE AND SALMETEROL 500; 50 UG/1; UG/1
POWDER RESPIRATORY (INHALATION)
Qty: 180 EACH | Refills: 10 | Status: SHIPPED | OUTPATIENT
Start: 2020-08-05 | End: 2021-01-01

## 2020-08-05 NOTE — TELEPHONE ENCOUNTER
Call received from [address] [place] [address] at [facility]. States they received the RX for the Advair 250/50, but the patient states it should be the 500/50. Calling to get a new Rx for the 500/50 sent over.

## 2020-08-20 ENCOUNTER — TELEPHONE (OUTPATIENT)
Dept: INTERNAL MEDICINE CLINIC | Age: 75
End: 2020-08-20

## 2020-08-20 NOTE — TELEPHONE ENCOUNTER
Patient is calling in stating he has had to use his ventolin inhaler more frequently over the past few months. States he is not due to fill his next Rx until next Wed but can not be without his inhaler that long. Would like to know if the frequency of use can be increased and a new Rx can be called in.

## 2020-08-20 NOTE — TELEPHONE ENCOUNTER
The instructions are already at the [facility]. Is he using his inhaler or his nebulizer? Why is he using so much? Does he need some steroids?

## 2020-09-01 NOTE — TELEPHONE ENCOUNTER
Pt contacted at home number. 2 pt identifiers confirmed. Pt informed of below. Pt states that he is using the inhaler more when he is out of the house. He also states that having the mask on makes it more difficult for him to breathe. Pt verbalized understanding. No other questions at this time.

## 2020-09-02 NOTE — TELEPHONE ENCOUNTER
Using it more than directed does not make it better and it can make things worse. How often, and how many puffs, is he using currently? And, is he using his nebulizer? He has to use the mask around people. With his lung disease, it probably does make him feel like he is smothering a bit. My best advice is to not be around people very long.  Slip the mask down from his nose if he is at least 10 feet away from anyone else, and only long enough to catch his breath

## 2020-11-16 ENCOUNTER — OFFICE VISIT (OUTPATIENT)
Dept: INTERNAL MEDICINE CLINIC | Age: 75
End: 2020-11-16
Payer: MEDICARE

## 2020-11-16 VITALS
TEMPERATURE: 97.2 F | SYSTOLIC BLOOD PRESSURE: 125 MMHG | OXYGEN SATURATION: 94 % | WEIGHT: 182 LBS | DIASTOLIC BLOOD PRESSURE: 77 MMHG | BODY MASS INDEX: 27.58 KG/M2 | RESPIRATION RATE: 18 BRPM | HEIGHT: 68 IN | HEART RATE: 91 BPM

## 2020-11-16 DIAGNOSIS — L60.8 TOENAIL DEFORMITY: ICD-10-CM

## 2020-11-16 DIAGNOSIS — I10 ESSENTIAL HYPERTENSION: ICD-10-CM

## 2020-11-16 DIAGNOSIS — E11.21 TYPE 2 DIABETES WITH NEPHROPATHY (HCC): Primary | ICD-10-CM

## 2020-11-16 DIAGNOSIS — E78.5 HYPERLIPIDEMIA, UNSPECIFIED HYPERLIPIDEMIA TYPE: Chronic | ICD-10-CM

## 2020-11-16 PROCEDURE — 99214 OFFICE O/P EST MOD 30 MIN: CPT | Performed by: INTERNAL MEDICINE

## 2020-11-16 PROCEDURE — 3017F COLORECTAL CA SCREEN DOC REV: CPT | Performed by: INTERNAL MEDICINE

## 2020-11-16 PROCEDURE — G8752 SYS BP LESS 140: HCPCS | Performed by: INTERNAL MEDICINE

## 2020-11-16 PROCEDURE — G8419 CALC BMI OUT NRM PARAM NOF/U: HCPCS | Performed by: INTERNAL MEDICINE

## 2020-11-16 PROCEDURE — G8754 DIAS BP LESS 90: HCPCS | Performed by: INTERNAL MEDICINE

## 2020-11-16 PROCEDURE — 1101F PT FALLS ASSESS-DOCD LE1/YR: CPT | Performed by: INTERNAL MEDICINE

## 2020-11-16 PROCEDURE — 2022F DILAT RTA XM EVC RTNOPTHY: CPT | Performed by: INTERNAL MEDICINE

## 2020-11-16 PROCEDURE — 3044F HG A1C LEVEL LT 7.0%: CPT | Performed by: INTERNAL MEDICINE

## 2020-11-16 PROCEDURE — G8427 DOCREV CUR MEDS BY ELIG CLIN: HCPCS | Performed by: INTERNAL MEDICINE

## 2020-11-16 PROCEDURE — G8536 NO DOC ELDER MAL SCRN: HCPCS | Performed by: INTERNAL MEDICINE

## 2020-11-16 PROCEDURE — G8432 DEP SCR NOT DOC, RNG: HCPCS | Performed by: INTERNAL MEDICINE

## 2020-11-16 NOTE — PROGRESS NOTES
RO[address] presents today for   Chief Complaint   Patient presents with    Hypertension    Diabetes    Cholesterol Problem       Amy Leger preferred language for health care discussion is english/other. Is someone accompanying this pt? no    Is the patient using any DME equipment during [address]? Rollator     Depression Screening:  3 most recent [facility] Screens 7/9/2020 2/12/2020 10/18/2018 6/18/2018 9/19/2017 7/25/2017 6/8/2017   Little interest or pleasure in doing things Not at all Not at all Not at all Not at all Not at all Not at all Not at all   Feeling down, depressed, irritable, or hopeless Not at all Not at all Not at all Not at all Not at all Not at all Not at all   Total Score PHQ 2 0 0 0 0 0 0 0       Learning Assessment:  Learning Assessment 8/5/2014   PRIMARY LEARNER Patient   HIGHEST LEVEL OF EDUCATION - PRIMARY LEARNER  GRADUATED [address] PRIMARY LEARNER NONE   CO-LEARNER CAREGIVER No   PRIMARY LANGUAGE ENGLISH   LEARNER PREFERENCE PRIMARY DEMONSTRATION     LISTENING     READING   ANSWERED BY patient   RELATIONSHIP SELF       Abuse Screening:  Abuse Screening Questionnaire 7/9/2019 9/19/2017 3/16/2016 2/18/2016 1/21/2016 11/27/2015 10/30/2015   Do you ever feel afraid of your partner? N N N N N N N   Are you in a relationship with someone who physically or mentally threatens you? N N N N N N N   Is it safe for you to go home? Telma PEARCE       Fall Risk  Fall Risk Assessment, last 12 mths 2/12/2020 10/18/2018 6/18/2018 9/19/2017 7/25/2017 6/8/2017 5/3/2017   Able to walk? Yes Yes Yes Yes Yes Yes Yes   Fall in past 12 months? No No No No No No No           Health Maintenance reviewed and discussed per provider.  Yes    Amy Leger is due for   Health Maintenance Due   Topic Date Due    GLAUCOMA SCREENING Q2Y  05/04/2020    Eye Exam Retinal or Dilated  05/04/2020    Flu Vaccine (1) 09/01/2020     Please order/place referral if appropriate. Advance Directive:  1. Do you have an advance directive in place? Patient Reply: no    2. If not, would you like material regarding how to put one in place? Patient Reply: no    Coordination of Care:  1. Have you been to the ER, urgent care clinic since your last visit? Hospitalized since your last visit? no    2. Have you seen or consulted any other health care providers outside of the [address] [place] [address] since your last visit? Include any pap smears or colon screening.  no

## 2020-11-16 NOTE — PROGRESS NOTES
HISTORY OF PRESENT ILLNESS  Deanna Figueroa is a 70 y.o. male. Visit Vitals  /77 (BP 1 Location: Left arm, BP Patient Position: Sitting)   Pulse 91   Temp 97.2 °F (36.2 °C) (Temporal)   Resp 18   Ht 5' 8\" (1.727 m)   Wt 182 lb (82.6 kg)   SpO2 94%   BMI 27.67 kg/m²       States overall he is doing well    ? Stable lung cancer  COPD has been OK recently    Hypertension    The history is provided by the patient. This is a chronic problem. The current episode started more than 1 week ago. The problem has not changed since onset. Associated symptoms include shortness of breath (at baseline). Pertinent negatives include no headaches and no dizziness. There are no associated agents to hypertension. Risk factors include dyslipidemia, diabetes mellitus and male gender. Diabetes   The history is provided by the patient. This is a chronic problem. The current episode started more than 1 week ago. The problem occurs daily. The problem has not changed since onset. Associated symptoms include shortness of breath (at baseline). Pertinent negatives include no headaches. Exacerbated by: diete. The symptoms are relieved by medications (diet). Treatments tried: see med lists. Review of Systems   Constitutional: Negative. Negative for chills and fever. HENT: Negative for congestion and sore throat. Respiratory: Positive for cough (intermittent, baseline) and shortness of breath (at baseline). Cardiovascular: Negative. Neurological: Negative for dizziness and headaches. Physical Exam  Vitals signs and nursing note reviewed. Constitutional:       General: He is not in acute distress. Appearance: Normal appearance. He is well-developed. He is not diaphoretic. HENT:      Head: Normocephalic and atraumatic. Cardiovascular:      Rate and Rhythm: Normal rate and regular rhythm. Pulmonary:      Effort: Pulmonary effort is normal.      Breath sounds: Normal breath sounds.    Musculoskeletal: Right lower leg: No edema. Left lower leg: No edema. Skin:     General: Skin is warm and dry. Neurological:      Mental Status: He is alert and oriented to person, place, and time. Psychiatric:         Mood and Affect: Mood normal.         Behavior: Behavior normal.         ASSESSMENT and PLAN    ICD-10-CM ICD-9-CM    1. Type 2 diabetes with nephropathy (HCC)  S04.04 276.71 METABOLIC PANEL, BASIC     583.81 LIPID PANEL      HEMOGLOBIN A1C W/O EAG      URINALYSIS W/ RFLX MICROSCOPIC      MICROALBUMIN, UR, RAND W/ MICROALB/CREAT RATIO   2. Hyperlipidemia, unspecified hyperlipidemia type  E78.5 272.4 LIPID PANEL   3. Essential hypertension  V64 085.2 METABOLIC PANEL, BASIC      LIPID PANEL      URINALYSIS W/ RFLX MICROSCOPIC   4. Toenail deformity  L60.8 703.9 REFERRAL TO PODIATRY     Encounter Diagnoses   Name Primary?     Type 2 diabetes with nephropathy (HCC) Yes    Hyperlipidemia, unspecified hyperlipidemia type     Essential hypertension     Toenail deformity        Doing well overall    BP controlled    DM has been controlled    update lab    F/u 6 months for HTN, DM

## 2020-11-17 LAB
ALBUMIN/CREAT UR: 15 MG/G CREAT (ref 0–29)
APPEARANCE UR: CLEAR
BILIRUB UR QL STRIP: NEGATIVE
BUN SERPL-MCNC: 13 MG/DL (ref 8–27)
BUN/CREAT SERPL: 20 (ref 10–24)
CALCIUM SERPL-MCNC: 9.6 MG/DL (ref 8.6–10.2)
CHLORIDE SERPL-SCNC: 99 MMOL/L (ref 96–106)
CHOLEST SERPL-MCNC: 142 MG/DL (ref 100–199)
CO2 SERPL-SCNC: 27 MMOL/L (ref 20–29)
COLOR UR: YELLOW
CREAT SERPL-MCNC: 0.64 MG/DL (ref 0.76–1.27)
CREAT UR-MCNC: 54.5 MG/DL
GLUCOSE SERPL-MCNC: 83 MG/DL (ref 65–99)
GLUCOSE UR QL: NEGATIVE
HBA1C MFR BLD: 6.2 % (ref 4.8–5.6)
HDLC SERPL-MCNC: 49 MG/DL
HGB UR QL STRIP: NEGATIVE
INTERPRETATION, 910389: NORMAL
KETONES UR QL STRIP: NEGATIVE
LDLC SERPL CALC-MCNC: 77 MG/DL (ref 0–99)
LEUKOCYTE ESTERASE UR QL STRIP: NEGATIVE
MICRO URNS: NORMAL
MICROALBUMIN UR-MCNC: 8.3 UG/ML
NITRITE UR QL STRIP: NEGATIVE
PH UR STRIP: 7 [PH] (ref 5–7.5)
POTASSIUM SERPL-SCNC: 4.9 MMOL/L (ref 3.5–5.2)
PROT UR QL STRIP: NEGATIVE
SODIUM SERPL-SCNC: 141 MMOL/L (ref 134–144)
SP GR UR: 1.01 (ref 1–1.03)
TRIGL SERPL-MCNC: 80 MG/DL (ref 0–149)
UROBILINOGEN UR STRIP-MCNC: 0.2 MG/DL (ref 0.2–1)
VLDLC SERPL CALC-MCNC: 16 MG/DL (ref 5–40)

## 2020-11-30 DIAGNOSIS — Z76.0 MEDICATION REFILL: ICD-10-CM

## 2020-11-30 RX ORDER — ALBUTEROL SULFATE 90 UG/1
AEROSOL, METERED RESPIRATORY (INHALATION)
Qty: 54 G | Refills: 10 | Status: SHIPPED | OUTPATIENT
Start: 2020-11-30 | End: 2021-05-25 | Stop reason: SDUPTHER

## 2020-12-22 RX ORDER — IPRATROPIUM BROMIDE AND ALBUTEROL SULFATE 2.5; .5 MG/3ML; MG/3ML
SOLUTION RESPIRATORY (INHALATION)
Qty: 90 ML | Refills: 4 | Status: SHIPPED | OUTPATIENT
Start: 2020-12-22

## 2021-01-01 DIAGNOSIS — Z76.0 MEDICATION REFILL: ICD-10-CM

## 2021-01-01 RX ORDER — FLUTICASONE PROPIONATE AND SALMETEROL 500; 50 UG/1; UG/1
POWDER RESPIRATORY (INHALATION)
Qty: 180 EACH | Refills: 9 | Status: SHIPPED | OUTPATIENT
Start: 2021-01-01

## 2021-01-01 RX ORDER — LOSARTAN POTASSIUM 50 MG/1
TABLET ORAL
Qty: 90 TABLET | Refills: 3 | Status: SHIPPED | OUTPATIENT
Start: 2021-01-01 | End: 2022-01-01

## 2021-01-29 DIAGNOSIS — Z76.0 MEDICATION REFILL: ICD-10-CM

## 2021-01-29 RX ORDER — PANTOPRAZOLE SODIUM 40 MG/1
TABLET, DELAYED RELEASE ORAL
Qty: 90 TAB | Refills: 2 | Status: SHIPPED | OUTPATIENT
Start: 2021-01-29 | End: 2021-05-18 | Stop reason: SDUPTHER

## 2021-02-11 ENCOUNTER — TELEPHONE (OUTPATIENT)
Dept: INTERNAL MEDICINE CLINIC | Age: 76
End: 2021-02-11

## 2021-02-11 NOTE — TELEPHONE ENCOUNTER
Patient is calling in to get you OK to get the COVID vaccination. Wants to make sure you are OK with him getting the shot.

## 2021-03-05 ENCOUNTER — TELEPHONE (OUTPATIENT)
Dept: INTERNAL MEDICINE CLINIC | Age: 76
End: 2021-03-05

## 2021-03-05 NOTE — TELEPHONE ENCOUNTER
Riomet is very expensive, he should check with his insurance or pharmacist re cost and if an authorization is needed  I have no prescribed azithromycin in over a year. Why does he need it now?

## 2021-03-05 NOTE — TELEPHONE ENCOUNTER
Aubrey Edge is having a hard time swalling the Metformin and Azithromycin. He was told by the pharmacy that he could take Riomet in place of the Metformin and that there was a liquid form of the Azithromycin. Would like to know if his prescriptions can be changed.

## 2021-03-08 NOTE — TELEPHONE ENCOUNTER
Spoke with patient and  2 patient identifiers confirmed. Advised patient of information below. Patient understood and had no further questions.  Patient is getting the medication from his lung

## 2021-04-20 DIAGNOSIS — Z76.0 MEDICATION REFILL: ICD-10-CM

## 2021-04-20 RX ORDER — METFORMIN HYDROCHLORIDE 500 MG/1
TABLET ORAL
Qty: 180 TAB | Refills: 4 | Status: SHIPPED | OUTPATIENT
Start: 2021-04-20 | End: 2021-05-18 | Stop reason: SDUPTHER

## 2021-04-27 ENCOUNTER — TELEPHONE (OUTPATIENT)
Dept: INTERNAL MEDICINE CLINIC | Age: 76
End: 2021-04-27

## 2021-04-27 NOTE — TELEPHONE ENCOUNTER
PA request received for Irpatropium/Albuterol [address] [facility]  BIN# [id number]  [phone]

## 2021-04-30 NOTE — TELEPHONE ENCOUNTER
Prior Auth pending for Ipratropium-Albuterol 0.5-2.5 (3)MG/3ML solution.  Key: [id number] - PA Case ID: [id number] - Rx #: [id number]

## 2021-05-04 NOTE — TELEPHONE ENCOUNTER
P. A. approved for pratropium-Albuterol 0.5-2.5 (3)MG/3ML solution.  Key: [id number] - PA Case ID: [id number] - Rx #: [id number]

## 2021-05-17 ENCOUNTER — TELEPHONE (OUTPATIENT)
Dept: INTERNAL MEDICINE CLINIC | Age: 76
End: 2021-05-17

## 2021-05-17 NOTE — TELEPHONE ENCOUNTER
Patient failed to keep appointment on 05/17/21. The following steps were taken: provider notified for review of record, patient unavailable left message to call back. The call was to inform pt a follow up appt is needed.

## 2021-05-18 ENCOUNTER — TELEPHONE (OUTPATIENT)
Dept: INTERNAL MEDICINE CLINIC | Age: 76
End: 2021-05-18

## 2021-05-18 RX ORDER — CALCIUM CITRATE/VITAMIN D3 200MG-6.25
TABLET ORAL
Qty: 100 STRIP | Refills: 4 | Status: SHIPPED | OUTPATIENT
Start: 2021-05-18

## 2021-05-18 RX ORDER — INSULIN PUMP SYRINGE, 3 ML
EACH MISCELLANEOUS
Qty: 1 KIT | Refills: 0 | Status: SHIPPED | OUTPATIENT
Start: 2021-05-18

## 2021-05-18 NOTE — TELEPHONE ENCOUNTER
Request from [address] [place] [address]  for :    [facility] [employer] True Metrix Test Strips

## 2021-05-25 DIAGNOSIS — Z76.0 MEDICATION REFILL: ICD-10-CM

## 2021-05-25 RX ORDER — ALBUTEROL SULFATE 90 UG/1
AEROSOL, METERED RESPIRATORY (INHALATION)
Qty: 54 G | Refills: 10 | Status: SHIPPED | OUTPATIENT
Start: 2021-05-25 | End: 2021-01-01

## 2021-05-25 NOTE — TELEPHONE ENCOUNTER
Pharmacy fax request for a new 90 day Rx.     Requested Prescriptions     Pending Prescriptions Disp Refills    albuterol (Ventolin HFA) 90 mcg/actuation inhaler 54 g 10

## 2022-01-01 ENCOUNTER — DOCUMENTATION ONLY (OUTPATIENT)
Dept: INTERNAL MEDICINE CLINIC | Age: 77
End: 2022-01-01

## 2022-01-01 ENCOUNTER — TELEPHONE (OUTPATIENT)
Dept: INTERNAL MEDICINE CLINIC | Age: 77
End: 2022-01-01

## 2022-01-01 ENCOUNTER — OFFICE VISIT (OUTPATIENT)
Dept: INTERNAL MEDICINE CLINIC | Age: 77
End: 2022-01-01
Payer: MEDICARE

## 2022-01-01 ENCOUNTER — HOSPITAL ENCOUNTER (OUTPATIENT)
Dept: ULTRASOUND IMAGING | Age: 77
End: 2022-01-01
Attending: INTERNAL MEDICINE

## 2022-01-01 VITALS
RESPIRATION RATE: 18 BRPM | TEMPERATURE: 97.2 F | OXYGEN SATURATION: 92 % | SYSTOLIC BLOOD PRESSURE: 109 MMHG | HEART RATE: 87 BPM | BODY MASS INDEX: 25.34 KG/M2 | WEIGHT: 167.2 LBS | HEIGHT: 68 IN | DIASTOLIC BLOOD PRESSURE: 61 MMHG

## 2022-01-01 VITALS
OXYGEN SATURATION: 95 % | WEIGHT: 166 LBS | TEMPERATURE: 97 F | RESPIRATION RATE: 19 BRPM | BODY MASS INDEX: 25.16 KG/M2 | HEIGHT: 68 IN | HEART RATE: 107 BPM | SYSTOLIC BLOOD PRESSURE: 96 MMHG | DIASTOLIC BLOOD PRESSURE: 60 MMHG

## 2022-01-01 VITALS
BODY MASS INDEX: 23.19 KG/M2 | WEIGHT: 153 LBS | TEMPERATURE: 98.4 F | HEART RATE: 100 BPM | HEIGHT: 68 IN | DIASTOLIC BLOOD PRESSURE: 62 MMHG | OXYGEN SATURATION: 98 % | RESPIRATION RATE: 16 BRPM | SYSTOLIC BLOOD PRESSURE: 87 MMHG

## 2022-01-01 VITALS
WEIGHT: 155 LBS | BODY MASS INDEX: 23.49 KG/M2 | SYSTOLIC BLOOD PRESSURE: 92 MMHG | TEMPERATURE: 96.5 F | OXYGEN SATURATION: 94 % | DIASTOLIC BLOOD PRESSURE: 55 MMHG | RESPIRATION RATE: 18 BRPM | HEIGHT: 68 IN | HEART RATE: 62 BPM

## 2022-01-01 DIAGNOSIS — J44.9 CHRONIC OBSTRUCTIVE PULMONARY DISEASE, UNSPECIFIED COPD TYPE (HCC): ICD-10-CM

## 2022-01-01 DIAGNOSIS — M79.606 PAIN OF LOWER EXTREMITY, UNSPECIFIED LATERALITY: ICD-10-CM

## 2022-01-01 DIAGNOSIS — Z76.0 MEDICATION REFILL: ICD-10-CM

## 2022-01-01 DIAGNOSIS — I10 PRIMARY HYPERTENSION: ICD-10-CM

## 2022-01-01 DIAGNOSIS — M25.561 RIGHT KNEE PAIN, UNSPECIFIED CHRONICITY: ICD-10-CM

## 2022-01-01 DIAGNOSIS — R63.0 ANOREXIA: ICD-10-CM

## 2022-01-01 DIAGNOSIS — R05.9 COUGH: ICD-10-CM

## 2022-01-01 DIAGNOSIS — R31.0 GROSS HEMATURIA: Primary | ICD-10-CM

## 2022-01-01 DIAGNOSIS — E11.21 TYPE 2 DIABETES WITH NEPHROPATHY (HCC): ICD-10-CM

## 2022-01-01 DIAGNOSIS — Z00.00 MEDICARE ANNUAL WELLNESS VISIT, SUBSEQUENT: Primary | ICD-10-CM

## 2022-01-01 DIAGNOSIS — R05.9 COUGH: Primary | ICD-10-CM

## 2022-01-01 DIAGNOSIS — G89.3 CANCER ASSOCIATED PAIN: ICD-10-CM

## 2022-01-01 LAB
ALBUMIN SERPL-MCNC: 4.3 G/DL (ref 3.7–4.7)
ALBUMIN/CREAT UR: 25 MG/G CREAT (ref 0–29)
ALBUMIN/GLOB SERPL: 1.6 {RATIO} (ref 1.2–2.2)
ALP SERPL-CCNC: 112 IU/L (ref 44–121)
ALT SERPL-CCNC: 15 IU/L (ref 0–44)
APPEARANCE UR: ABNORMAL
AST SERPL-CCNC: 20 IU/L (ref 0–40)
BACTERIA #/AREA URNS HPF: ABNORMAL /[HPF]
BACTERIA UR CULT: NO GROWTH
BILIRUB SERPL-MCNC: 0.3 MG/DL (ref 0–1.2)
BILIRUB UR QL STRIP: NEGATIVE
BUN SERPL-MCNC: 14 MG/DL (ref 8–27)
BUN/CREAT SERPL: 23 (ref 10–24)
CALCIUM SERPL-MCNC: 9.5 MG/DL (ref 8.6–10.2)
CASTS URNS QL MICRO: ABNORMAL /LPF
CHLORIDE SERPL-SCNC: 101 MMOL/L (ref 96–106)
CHOLEST SERPL-MCNC: 146 MG/DL (ref 100–199)
CO2 SERPL-SCNC: 28 MMOL/L (ref 20–29)
COLOR UR: ABNORMAL
CREAT SERPL-MCNC: 0.6 MG/DL (ref 0.76–1.27)
CREAT UR-MCNC: 113.8 MG/DL
CYTOLOGY SPEC DOC CYTO: NORMAL
DX ICD CODE: NORMAL
DX ICD CODE: NORMAL
EPI CELLS #/AREA URNS HPF: >10 /HPF (ref 0–10)
GLOBULIN SER CALC-MCNC: 2.7 G/DL (ref 1.5–4.5)
GLUCOSE SERPL-MCNC: 89 MG/DL (ref 65–99)
GLUCOSE UR QL STRIP: NEGATIVE
HBA1C MFR BLD: 6.1 % (ref 4.8–5.6)
HDLC SERPL-MCNC: 58 MG/DL
HGB UR QL STRIP: ABNORMAL
IMP & REVIEW OF LAB RESULTS: NORMAL
KETONES UR QL STRIP: NEGATIVE
LDLC SERPL CALC-MCNC: 72 MG/DL (ref 0–99)
LEUKOCYTE ESTERASE UR QL STRIP: ABNORMAL
MICRO URNS: ABNORMAL
MICROALBUMIN UR-MCNC: 28.1 UG/ML
NITRITE UR QL STRIP: NEGATIVE
PATH REPORT.GROSS SPEC: NORMAL
PATH REPORT.SITE OF ORIGIN SPEC: NORMAL
PATHOLOGIST NAME: NORMAL
PERFORMED BY, 191120: NORMAL
PH UR STRIP: 5.5 [PH] (ref 5–7.5)
POTASSIUM SERPL-SCNC: 4.4 MMOL/L (ref 3.5–5.2)
PROT SERPL-MCNC: 7 G/DL (ref 6–8.5)
PROT UR QL STRIP: ABNORMAL
RBC #/AREA URNS HPF: >30 /HPF (ref 0–2)
SODIUM SERPL-SCNC: 146 MMOL/L (ref 134–144)
SP GR UR STRIP: 1.01 (ref 1–1.03)
TRIGL SERPL-MCNC: 83 MG/DL (ref 0–149)
UROBILINOGEN UR STRIP-MCNC: 0.2 MG/DL (ref 0.2–1)
VLDLC SERPL CALC-MCNC: 16 MG/DL (ref 5–40)
WBC #/AREA URNS HPF: ABNORMAL /HPF (ref 0–5)

## 2022-01-01 PROCEDURE — G0439 PPPS, SUBSEQ VISIT: HCPCS | Performed by: INTERNAL MEDICINE

## 2022-01-01 PROCEDURE — G8510 SCR DEP NEG, NO PLAN REQD: HCPCS | Performed by: INTERNAL MEDICINE

## 2022-01-01 PROCEDURE — 99214 OFFICE O/P EST MOD 30 MIN: CPT | Performed by: INTERNAL MEDICINE

## 2022-01-01 PROCEDURE — G8536 NO DOC ELDER MAL SCRN: HCPCS | Performed by: INTERNAL MEDICINE

## 2022-01-01 PROCEDURE — G8756 NO BP MEASURE DOC: HCPCS | Performed by: INTERNAL MEDICINE

## 2022-01-01 PROCEDURE — G8752 SYS BP LESS 140: HCPCS | Performed by: INTERNAL MEDICINE

## 2022-01-01 PROCEDURE — 1101F PT FALLS ASSESS-DOCD LE1/YR: CPT | Performed by: INTERNAL MEDICINE

## 2022-01-01 PROCEDURE — G8427 DOCREV CUR MEDS BY ELIG CLIN: HCPCS | Performed by: INTERNAL MEDICINE

## 2022-01-01 PROCEDURE — G8419 CALC BMI OUT NRM PARAM NOF/U: HCPCS | Performed by: INTERNAL MEDICINE

## 2022-01-01 PROCEDURE — G8420 CALC BMI NORM PARAMETERS: HCPCS | Performed by: INTERNAL MEDICINE

## 2022-01-01 PROCEDURE — 3017F COLORECTAL CA SCREEN DOC REV: CPT | Performed by: INTERNAL MEDICINE

## 2022-01-01 PROCEDURE — 99213 OFFICE O/P EST LOW 20 MIN: CPT | Performed by: INTERNAL MEDICINE

## 2022-01-01 PROCEDURE — G8754 DIAS BP LESS 90: HCPCS | Performed by: INTERNAL MEDICINE

## 2022-01-01 PROCEDURE — 2022F DILAT RTA XM EVC RTNOPTHY: CPT | Performed by: INTERNAL MEDICINE

## 2022-01-01 PROCEDURE — 3046F HEMOGLOBIN A1C LEVEL >9.0%: CPT | Performed by: INTERNAL MEDICINE

## 2022-01-01 PROCEDURE — 20610 DRAIN/INJ JOINT/BURSA W/O US: CPT | Performed by: INTERNAL MEDICINE

## 2022-01-01 RX ORDER — TAMSULOSIN HYDROCHLORIDE 0.4 MG/1
CAPSULE ORAL
COMMUNITY
Start: 2022-01-01

## 2022-01-01 RX ORDER — HYDROCODONE BITARTRATE AND ACETAMINOPHEN 5; 325 MG/1; MG/1
2 TABLET ORAL
Qty: 180 TABLET | Refills: 0 | Status: SHIPPED | OUTPATIENT
Start: 2022-01-01 | End: 2022-01-01

## 2022-01-01 RX ORDER — FAMOTIDINE 20 MG/1
TABLET, FILM COATED ORAL
COMMUNITY
Start: 2021-01-01 | End: 2022-01-01

## 2022-01-01 RX ORDER — DRONABINOL 5 MG/1
5 CAPSULE ORAL 2 TIMES DAILY
Qty: 60 CAPSULE | Refills: 5 | Status: SHIPPED | OUTPATIENT
Start: 2022-01-01

## 2022-01-01 RX ORDER — METFORMIN HYDROCHLORIDE 500 MG/1
TABLET ORAL
Qty: 180 TABLET | Refills: 3 | Status: SHIPPED | OUTPATIENT
Start: 2022-01-01

## 2022-01-01 RX ORDER — AZITHROMYCIN 250 MG/1
TABLET, FILM COATED ORAL
Qty: 6 TABLET | Refills: 3 | Status: SHIPPED | OUTPATIENT
Start: 2022-01-01

## 2022-01-01 RX ORDER — SIMVASTATIN 20 MG/1
TABLET, FILM COATED ORAL
Qty: 90 TABLET | Refills: 3 | Status: SHIPPED | OUTPATIENT
Start: 2022-01-01

## 2022-01-01 RX ORDER — PANTOPRAZOLE SODIUM 40 MG/1
40 TABLET, DELAYED RELEASE ORAL DAILY
Qty: 90 TABLET | Refills: 4 | Status: SHIPPED | OUTPATIENT
Start: 2022-01-01

## 2022-01-01 RX ORDER — CIPROFLOXACIN 250 MG/1
250 TABLET, FILM COATED ORAL EVERY 12 HOURS
Qty: 14 TABLET | Refills: 0 | Status: SHIPPED | OUTPATIENT
Start: 2022-01-01 | End: 2022-01-01

## 2022-01-01 RX ORDER — CODEINE PHOSPHATE AND GUAIFENESIN 10; 100 MG/5ML; MG/5ML
10 SOLUTION ORAL
Qty: 180 ML | Refills: 2 | Status: SHIPPED | OUTPATIENT
Start: 2022-01-01 | End: 2022-01-01

## 2022-01-01 RX ORDER — TRIAMCINOLONE ACETONIDE 40 MG/ML
40 INJECTION, SUSPENSION INTRA-ARTICULAR; INTRAMUSCULAR ONCE
Status: COMPLETED | OUTPATIENT
Start: 2022-01-01 | End: 2022-01-01

## 2022-01-01 RX ORDER — CEPHALEXIN 500 MG/1
CAPSULE ORAL
COMMUNITY
Start: 2022-01-01 | End: 2022-01-01 | Stop reason: ALTCHOICE

## 2022-01-01 RX ADMIN — TRIAMCINOLONE ACETONIDE 40 MG: 40 INJECTION, SUSPENSION INTRA-ARTICULAR; INTRAMUSCULAR at 17:00

## 2022-01-06 NOTE — PROGRESS NOTES
HISTORY OF PRESENT ILLNESS  Lynda Maxwell is a 78 y.o. male. /61 (BP 1 Location: Left upper arm, BP Patient Position: Sitting, BP Cuff Size: Adult)   Pulse 87   Temp 97.2 °F (36.2 °C) (Temporal)   Resp 18   Ht 5' 8\" (1.727 m)   Wt 167 lb 3.2 oz (75.8 kg)   SpO2 92%   BMI 25.42 kg/m²     Had been going to [facility] [place] for a while due to confusion over insurance      Will be having some dental work (and his tongue will be clipped)    He needs cataract surgery    He next will need some carpal tunnel syndrome        Hypertension   The history is provided by the patient. This is a chronic problem. The current episode started more than 1 week ago. The problem has not changed since onset. Associated symptoms include shortness of breath (baseline). Pertinent negatives include no chest pain and no palpitations. There are no associated agents to hypertension. Risk factors include diabetes mellitus and dyslipidemia. Diabetes  The history is provided by the patient. This is a chronic problem. The current episode started more than 1 week ago. The problem occurs daily. The problem has not changed since onset. Associated symptoms include shortness of breath (baseline). Pertinent negatives include no chest pain. Cholesterol Problem  The history is provided by the patient. This is a chronic problem. The current episode started more than 1 week ago. The problem occurs daily. Associated symptoms include shortness of breath (baseline). Pertinent negatives include no chest pain. Review of Systems   Constitutional: Negative for chills and fever. HENT: Positive for congestion. Respiratory: Positive for cough and shortness of breath (baseline). Cardiovascular: Negative for chest pain and palpitations. Physical Exam  Vitals and nursing note reviewed. Constitutional:       General: He is not in acute distress. Appearance: Normal appearance. He is well-developed. He is not diaphoretic.    HENT: Head: Normocephalic and atraumatic. Cardiovascular:      Rate and Rhythm: Normal rate and regular rhythm. Pulmonary:      Effort: Pulmonary effort is normal.      Breath sounds: Normal breath sounds. Musculoskeletal:      Right lower leg: No edema. Left lower leg: No edema. Skin:     General: Skin is warm and dry. Neurological:      General: No focal deficit present. Mental Status: He is alert and oriented to person, place, and time. Comments: Diabetic foot exam:     Left Foot:   Visual Exam: severe bunion. Overlapping toes. Pressure point on 2nd toe   Pulse DP: 1+ (weak)   Filament test: normal sensation    Vibratory sensation: diminished      Right Foot:   Visual Exam: severe bunion and overlapping toe   Pulse DP: 1+ (weak)   Filament test: normal sensation    Vibratory sensation: diminished     Psychiatric:         Mood and Affect: Mood normal.         Behavior: Behavior normal.         ASSESSMENT and PLAN    ICD-10-CM ICD-9-CM           2. Type 2 diabetes with nephropathy ([facility])  Z56.52 096.66 METABOLIC PANEL, COMPREHENSIVE     583.81 LIPID PANEL      HEMOGLOBIN A1C W/O EAG      MICROALBUMIN, UR, RAND W/ MICROALB/CREAT RATIO       DIABETES FOOT EXAM   3. Primary hypertension  Z87 737.1 METABOLIC PANEL, COMPREHENSIVE      LIPID PANEL   4. Chronic obstructive pulmonary disease, unspecified COPD type ([facility])  J44.9 496 guaiFENesin 200 mg/5 mL liqd   5. Medication refill  Z76.0 V68.1 pantoprazole (PROTONIX) 40 mg tablet     BP controlled    COPD stable.    H/o lung cancer    DM has been controlled    Update lab    F/u 4 months for HTN, DM,

## 2022-01-06 NOTE — PROGRESS NOTES
Reviewed record in preparation for visit and have obtained necessary documentation. Daniel Means is a 78 y.o.  male presents today for office visit for   Chief Complaint   Patient presents with    Hypertension    Diabetes    Cholesterol Problem    Annual Wellness Visit   . Pt is  fasting. Patient is accompanied by self. I have received verbal consent from Daniel Means to discuss any/all medical information while they are present in the room. Pt is in Room # [address] preferred language for health care discussion is english/other. Is the patient using any DME equipment during OV? YES    Advance Directive:  1. Do you have an advance directive in place? Patient Reply: NO    2. If not, would you like material regarding how to put one in place? NO      1. \"Have you been to the ER, urgent care clinic since your last visit? Hospitalized since your last visit? \" Yes [facility] 2021    2. \"Have you seen or consulted any other health care providers outside of the [address] [place] [address] since your last visit? \" Yes, Surgeon [facility]. Maddison Melvin at [facility]    3. For patients aged 39-70: Has the patient had a colonoscopy? Yes, HM satisfied with blue hyperlink     If the patient is female:    4. For patients aged 41-77: Has the patient had a mammogram within the past 2 years? NA based on age or sex    11. For patients aged 21-65: Has the patient had a pap smear?  NA based on age or sex    Upcoming Appts  Yes Reason for visit: Surgery, Dr. Kendrick Elizabeth 3/2022, eye specialist - [facility]: No orders of the defined types were placed in this encounter.  Bob Segovia MD/Malathi Casper LPN    Daniel Means is due for:   Health Maintenance Due   Topic    COVID-19 Vaccine (1)    Eye Exam Retinal or Dilated     Foot Exam Q1     Medicare Yearly Exam     Flu Vaccine (1)    A1C test (Diabetic or Prediabetic)     MICROALBUMIN Q1     Lipid Screen      Health Maintenance reviewed and discussed per provider  Please order/place referral if appropriate. Requested Prescriptions      No prescriptions requested or ordered in this encounter       Learning Assessment:  Learning Assessment 8/5/2014   PRIMARY LEARNER Patient   HIGHEST LEVEL OF EDUCATION - PRIMARY LEARNER  GRADUATED HIGH SCHOOL OR GED   BARRIERS PRIMARY LEARNER NONE   CO-LEARNER CAREGIVER No   PRIMARY LANGUAGE ENGLISH   LEARNER PREFERENCE PRIMARY DEMONSTRATION     LISTENING     READING   ANSWERED BY patient   RELATIONSHIP SELF     Depression Screening:  3 most recent [facility] Screens 7/9/2020 2/12/2020 10/18/2018 6/18/2018 9/19/2017 7/25/2017 6/8/2017   Little interest or pleasure in doing things Not at all Not at all Not at all Not at all Not at all Not at all Not at all   Feeling down, depressed, irritable, or hopeless Not at all Not at all Not at all Not at all Not at all Not at all Not at all   Total Score PHQ 2 0 0 0 0 0 0 0     Abuse Screening:  Abuse Screening Questionnaire 7/9/2019 9/19/2017 3/16/2016 2/18/2016 1/21/2016 11/27/2015 10/30/2015   Do you ever feel afraid of your partner? N N N N N N N   Are you in a relationship with someone who physically or mentally threatens you? N N N N N N N   Is it safe for you to go home? Yolanda PEARCE     Fall Risk  Fall Risk Assessment, last 12 mths 2/12/2020 10/18/2018 6/18/2018 9/19/2017 7/25/2017 6/8/2017 5/3/2017   Able to walk? Yes Yes Yes Yes Yes Yes Yes   Fall in past 12 months?  No No No No No No No     Recent Travel Screening and Travel History documentation     Travel Screening     No screening recorded since 01/05/22 0000     Travel History   Travel since 12/06/21    No documented travel since 12/06/21

## 2022-01-06 NOTE — PROGRESS NOTES
This is the Subsequent Medicare Annual Wellness Exam, performed 12 months or more after the Initial AWV or the last Subsequent AWV    I have reviewed the patient's medical history in detail and updated the computerized patient record. Assessment/Plan   Education and counseling provided:  Are appropriate based on today's review and evaluation    1. Medicare annual wellness visit, subsequent       Depression Risk Factor Screening     3 most recent PHQ Screens 1/6/2022   Little interest or pleasure in doing things Not at all   Feeling down, depressed, irritable, or hopeless Not at all   Total Score PHQ 2 0       Alcohol & Drug Abuse Risk Screen    Do you average more than 1 drink per night or more than 7 drinks a week: No    In the past three months have you have had more than 4 drinks containing alcohol on one occasion: No          Functional Ability and Level of Safety    Hearing: Hearing is good. Activities of Daily Living: The home contains: grab bars and walker  Patient does total self care      Ambulation: with difficulty, uses a walker     Fall Risk:  Fall Risk Assessment, last 12 mths 1/6/2022   Able to walk? Yes   Fall in past 12 months? 1   Do you feel unsteady? 1   Are you worried about falling 0   Is TUG test greater than 12 seconds? 1   Is the gait abnormal? 1   Number of falls in past 12 months 2   Fall with injury?  0      Abuse Screen:  Patient is not abused       Cognitive Screening    Has your family/caregiver stated any concerns about your memory: no     Cognitive Screening: Normal - Animal Naming Test    Health Maintenance Due     Health Maintenance Due   Topic Date Due    Eye Exam Retinal or Dilated  05/04/2020    Foot Exam Q1  07/09/2020    Flu Vaccine (1) 09/01/2021    A1C test (Diabetic or Prediabetic)  11/16/2021    MICROALBUMIN Q1  11/16/2021    Lipid Screen  11/16/2021       Patient Care Team   Patient Care Team:  Jeanna Abad MD as PCP - General (Internal Medicine)  Yamile Wylie MD as PCP - [facility] Empaneled Provider  Natalie Luis MD (Gastroenterology)  Dev Squires, ANASTASIA as Ambulatory Care Manager  Carol Bunch MD as Consulting Provider (Cardiology)  Lucia Ortiz MD as Consulting Provider (Pulmonary Disease)  Wilmar Talamantes MD as Consulting Provider (Optometry)  Giovanni Romero MD (Pulmonary Disease)    History     Patient Active Problem List   Diagnosis Code    COPD (chronic obstructive pulmonary disease) ([address] J44.9    GERD (gastroesophageal reflux disease) K21.9    CAD (coronary artery disease) I25.10    Diabetes mellitus type 2, controlled, without complications ([address] E00.0    Hyperlipidemia E78.5    Chronic low back pain M54.50, G89.29    Oxygen dependent Z99.81    Advanced care planning/counseling discussion Z71.89    Type 2 diabetes with nephropathy ([address] E11.21    Hypertension I10    Chronic obstructive pulmonary disease ([address] J44.9     Past Medical History:   Diagnosis Date    Bunion of great toe of left foot     Bunion of great toe of right foot     CAD (coronary artery disease) 8/5/2014    Chronic obstructive pulmonary disease ([address] 2006    CTS (carpal tunnel syndrome)     left    Diabetes ([address] 2006    Diabetic retinopathy, nonproliferative, mild ([address] 05/04/2018    GERD (gastroesophageal reflux disease) 8/5/2014    Hammertoe     Hyperlipidemia 8/5/2014    Hypertension     Mobility impaired     Oxygen dependent     Pulmonary nodules 03/24/2017    sees Dr. Mario Ulloa SCC (squamous cell carcinoma of lung) ([facility] 08/22/2017    left apex    Stasis dermatitis       Past Surgical History:   Procedure Laterality Date    HX CORONARY ARTERY BYPASS GRAFT  8/6/2006    4 way bipass     Current Outpatient Medications   Medication Sig Dispense Refill    albuterol (Ventolin HFA) 90 mcg/actuation inhaler USE 2 PUFFS EVERY FOUR HOURS 54 g 11    losartan (COZAAR) 50 mg tablet TAKE 1 TABLET BY MOUTH EVERY DAY 90 Tablet 3    fluticasone propion-salmeteroL (Advair Diskus) 500-50 mcg/dose diskus inhaler TAKE 1 PUFF INHALED BY MOUTH TWICE DAILY. RINSE YOUR MOUTH. 180 Each 9    metFORMIN (GLUCOPHAGE) 500 mg tablet TAKE 1 TABLET BY MOUTH TWICE DAILY WITH MEALS 180 Tab 4    metoprolol succinate (TOPROL-XL) 25 mg XL tablet Take 1 Tab by mouth daily. 90 Tab 4    alcohol swabs (BD Single Use Swabs Regular) padm Use as directed to test blood sugar 100 Pad 4    simvastatin (ZOCOR) 20 mg tablet Take 1 Tab by mouth nightly. Indications: excessive fat in the blood 90 Tab 4    albuterol (PROVENTIL VENTOLIN) 2.5 mg /3 mL (0.083 %) nebu 3 mL by Nebulization route every four (4) hours as needed for Wheezing. Dispense 100 unit dose vials 100 Each 4    azithromycin (ZITHROMAX) 250 mg tablet Take as directed on package 6 Tab 3    tiotropium (Spiriva with HandiHaler) 18 mcg inhalation capsule Take 1 Cap by inhalation daily. 90 Cap 4    Blood-Glucose Meter (True Metrix Air Glucose Meter) monitoring kit Use to test blood sugar once daily or as directed. 1 Kit 0    glucose blood VI test strips (True Metrix Glucose Test Strip) strip Use to test blood sugar once daily 100 Strip 4    albuterol-ipratropium (DUO-NEB) 2.5 mg-0.5 mg/3 ml nebu USE 1 VIAL WITH HAND HELD NEBULIZER EVERY SIX HOURS AS NEEDED 90 mL 4    roflumilast (DALIRESP) 500 mcg tab tablet Take 500 mcg by mouth.  Blood Glucose Control, Normal (ACCU-CHEK SMARTVIEW CONTRL SOL) soln Use to test meter with each new bottle of test strips 2 mL prn    Lancets (ACCU-CHEK FASTCLIX) misc Use to test blood sugar daily 100 Each 4    clotrimazole-betamethasone (LOTRISONE) topical cream Apply to affected area BID for 2 weeks. 30 g 11    bumetanide (BUMEX) 1 mg tablet Take one tablet weekly 12 Tab 3    Aspirin, Buffered 81 mg tab Take  by mouth.  potassium chloride SR (KLOR-CON 10) 10 mEq tablet Take 20 mEq by mouth two (2) times a day.       famotidine (PEPCID) 20 mg tablet (Patient not taking: Reported on 1/6/2022)      pantoprazole (PROTONIX) 40 mg tablet Take 1 Tab by mouth daily.  Indications: gastroesophageal reflux disease 90 Tab 4     Allergies   Allergen Reactions    Ace Inhibitors Cough       Family History   Problem Relation Age of Onset    Heart Disease Father     Cancer Father     Diabetes Brother     Diabetes Maternal Grandmother     No Known Problems Mother     Hypertension Paternal Grandmother      Social History     Tobacco Use    Smoking status: Former Smoker     Packs/day: 2.00     Years: 56.00     Pack years: 112.00     Types: Cigarettes     Quit date: 8/6/2006     Years since quitting: 15.4    Smokeless tobacco: Never Used   Substance Use Topics    Alcohol use: No     Comment: rare         Oniel Dexter MD

## 2022-01-06 NOTE — PATIENT INSTRUCTIONS
Medicare Wellness Visit, Male    The best way to live healthy is to have a lifestyle where you eat a well-balanced diet, exercise regularly, limit alcohol use, and quit all forms of tobacco/nicotine, if applicable. Regular preventive services are another way to keep healthy. Preventive services (vaccines, screening tests, monitoring & exams) can help personalize your care plan, which helps you manage your own care. Screening tests can find health problems at the earliest stages, when they are easiest to treat. [facility] follows the current, evidence-based guidelines published by the [facility] [employer] ([facility]) when recommending preventive services for our patients. Because we follow these guidelines, sometimes recommendations change over time as research supports it. (For example, a prostate screening blood test is no longer routinely recommended for men with no symptoms). Of course, you and your doctor may decide to screen more often for some diseases, based on your risk and co-morbidities (chronic disease you are already diagnosed with). Preventive services for you include:  - Medicare offers their members a free annual wellness visit, which is time for you and your primary care provider to discuss and plan for your preventive service needs. Take advantage of this benefit every year!  -All adults over age 72 should receive the recommended pneumonia vaccines. Current USPSTF guidelines recommend a series of two vaccines for the best pneumonia protection.   -All adults should have a flu vaccine yearly and tetanus vaccine every 10 years.  -All adults age 48 and older should receive the shingles vaccines (series of two vaccines).        -All adults age 38-68 who are overweight should have a diabetes screening test once every three years.   -Other screening tests & preventive services for persons with diabetes include: an eye exam to screen for diabetic retinopathy, a kidney function test, a foot exam, and stricter control over your cholesterol.   -Cardiovascular screening for adults with routine risk involves an electrocardiogram (ECG) at intervals determined by the provider.   -Colorectal cancer screening should be done for adults age 54-65 with no increased risk factors for colorectal cancer. There are a number of acceptable methods of screening for this type of cancer. Each test has its own benefits and drawbacks. Discuss with your provider what is most appropriate for you during your annual wellness visit. The different tests include: colonoscopy (considered the best screening method), a fecal occult blood test, a fecal DNA test, and sigmoidoscopy.  -All adults born between [facility] should be screened once for Hepatitis C.  -An Abdominal Aortic Aneurysm (AAA) Screening is recommended for men age 73-68 who has ever smoked in their lifetime.      Here is a list of your current Health Maintenance items (your personalized list of preventive services) with a due date:  Health Maintenance Due   Topic Date Due    Eye Exam  05/04/2020    Diabetic Foot Care  07/09/2020    Yearly Flu Vaccine (1) 09/01/2021    Hemoglobin A1C    11/16/2021    Albumin Urine Test  11/16/2021    Cholesterol Test   11/16/2021

## 2022-03-17 NOTE — TELEPHONE ENCOUNTER
Pt states she has a lot mucus build up in her throat and says she is a COPD patient as well. She has tried all of the over the counter med's and they don't seem to be working. She is requesting a prescriptions for this problem.

## 2022-03-18 NOTE — TELEPHONE ENCOUNTER
S/w the pt regarding to mucus. Pt states he is taking all of his prescribed medications. Pt states he has not been on Daliresp . Pt states the mucus appears thich with slight yelowish white color. Pt is requesting an antibiotic. Will notify.

## 2022-05-04 NOTE — PROGRESS NOTES
1. \"Have you been to the ER, urgent care clinic since your last visit? Hospitalized since your last visit? \" Yes UTI at [address] 04/30/22    2. \"Have you seen or consulted any other health care providers outside of the [address] [place] [address] since your last visit? \" No     3. For patients aged 39-70: Has the patient had a colonoscopy / FIT/ Cologuard? Yes - no Care Gap present      If the patient is female:    4. For patients aged 41-77: Has the patient had a mammogram within the past 2 years? NA - based on age or sex      11. For patients aged 21-65: Has the patient had a pap smear?  NA - based on age or sex

## 2022-05-04 NOTE — PROGRESS NOTES
HISTORY OF PRESENT ILLNESS  Home He is a 78 y.o. male. BP 96/60 (BP 1 Location: Left upper arm, BP Patient Position: Sitting, BP Cuff Size: Adult)   Pulse (!) 107   Temp 97 °F (36.1 °C) (Temporal)   Resp 19   Ht 5' 8\" (1.727 m)   Wt 166 lb (75.3 kg)   SpO2 95%   BMI 25.24 kg/m²     Was seen at [facility] with hematuria and was told he had a UTI  He was place on flomax and keflex    He is drinking cranberry juice and thought it was discoloring his urine as it is still red. 4/30/22:   Small Abnormal   Negative bilirubin confirmed by Ictotest.   Urine Urobilinogen <2.0 mg/dL 0.2    Urine RBC Negative, 0-2 /hpf >100 Abnormal     Urine WBC 0 - 2 /hpf 21-50 Abnormal     Urine Bacteria Negative Negative    Squamous Epithelial Cells 0 - 2 /hpf 3-5 Abnormal     Hyaline Cast 0 - 2 /lpf 0-2    Ictotest Negative Negative    Resulting Agency  [facility]   Specimen Collected: 04/30/22  4:32 PM Last Resulted: 04/30/22  4:46 PM  Received From: Marisol1 SRadha Menchaca  Result Received: 05/04/22  3:15 PM                  Current Outpatient Medications:     tamsulosin (FLOMAX) 0.4 mg capsule, TAKE 1 CAPSULE BY MOUTH EVERYDAY AT BEDTIME, Disp: , Rfl:     ciprofloxacin HCl (CIPRO) 250 mg tablet, Take 1 Tablet by mouth every twelve (12) hours for 7 days. , Disp: 14 Tablet, Rfl: 0    azithromycin (ZITHROMAX) 250 mg tablet, Take as directed on package, Disp: 6 Tablet, Rfl: 3    simvastatin (ZOCOR) 20 mg tablet, TAKE 1 TABLET BY MOUTH EVERY EVENING, Disp: 90 Tablet, Rfl: 3    pantoprazole (PROTONIX) 40 mg tablet, Take 1 Tablet by mouth daily. Indications: gastroesophageal reflux disease, Disp: 90 Tablet, Rfl: 4    guaiFENesin 200 mg/5 mL liqd, Take 10 mL by mouth two (2) times a day.  Indications: cough, Disp: 473 mL, Rfl: 5    albuterol (Ventolin HFA) 90 mcg/actuation inhaler, USE 2 PUFFS EVERY FOUR HOURS, Disp: 54 g, Rfl: 11    losartan (COZAAR) 50 mg tablet, TAKE 1 TABLET BY MOUTH EVERY DAY, Disp: 90 Tablet, Rfl: 3    fluticasone propion-salmeteroL (Advair Diskus) 500-50 mcg/dose diskus inhaler, TAKE 1 PUFF INHALED BY MOUTH TWICE DAILY. RINSE YOUR MOUTH., Disp: 180 Each, Rfl: 9    metFORMIN (GLUCOPHAGE) 500 mg tablet, TAKE 1 TABLET BY MOUTH TWICE DAILY WITH MEALS, Disp: 180 Tab, Rfl: 4    metoprolol succinate (TOPROL-XL) 25 mg XL tablet, Take 1 Tab by mouth daily. , Disp: 90 Tab, Rfl: 4    alcohol swabs (BD Single Use Swabs Regular) padm, Use as directed to test blood sugar, Disp: 100 Pad, Rfl: 4    albuterol (PROVENTIL VENTOLIN) 2.5 mg /3 mL (0.083 %) nebu, 3 mL by Nebulization route every four (4) hours as needed for Wheezing. Dispense 100 unit dose vials, Disp: 100 Each, Rfl: 4    tiotropium (Spiriva with HandiHaler) 18 mcg inhalation capsule, Take 1 Cap by inhalation daily. , Disp: 90 Cap, Rfl: 4    Blood-Glucose Meter (True Metrix Air Glucose Meter) monitoring kit, Use to test blood sugar once daily or as directed., Disp: 1 Kit, Rfl: 0    glucose blood VI test strips (True Metrix Glucose Test Strip) strip, Use to test blood sugar once daily, Disp: 100 Strip, Rfl: 4    albuterol-ipratropium (DUO-NEB) 2.5 mg-0.5 mg/3 ml nebu, USE 1 VIAL WITH HAND HELD NEBULIZER EVERY SIX HOURS AS NEEDED, Disp: 90 mL, Rfl: 4    roflumilast (DALIRESP) 500 mcg tab tablet, Take 500 mcg by mouth., Disp: , Rfl:     Blood Glucose Control, Normal (ACCU-CHEK SMARTVIEW CONTRL SOL) soln, Use to test meter with each new bottle of test strips, Disp: 2 mL, Rfl: prn    Lancets (ACCU-CHEK FASTCLIX) misc, Use to test blood sugar daily, Disp: 100 Each, Rfl: 4    clotrimazole-betamethasone (LOTRISONE) topical cream, Apply to affected area BID for 2 weeks. , Disp: 30 g, Rfl: 11    bumetanide (BUMEX) 1 mg tablet, Take one tablet weekly, Disp: 12 Tab, Rfl: 3    Aspirin, Buffered 81 mg tab, Take  by mouth., Disp: , Rfl:     potassium chloride SR (KLOR-CON 10) 10 mEq tablet, Take 20 mEq by mouth two (2) times a day., Disp: , Rfl: Current Facility-Administered Medications:     triamcinolone acetonide (KENALOG-40) 40 mg/mL injection 40 mg, 40 mg, Intra artICUlar, ONCE, Sherin Aguilar MD      Leg Pain   The history is provided by the patient (focused on his right knee. It hurst to straighten his leg. Recalls no injury). This is a recurrent problem. The pain is present in the right knee. Blood in Urine   The history is provided by the patient. This is a new problem. The current episode started more than 1 week ago. Associated symptoms include hematuria. Pertinent negatives include no chills. Review of Systems   Constitutional: Negative for chills and fever. Genitourinary: Positive for hematuria. Physical Exam  Vitals and nursing note reviewed. Constitutional:       General: He is not in acute distress. Appearance: Normal appearance. He is well-developed. He is not diaphoretic. Cardiovascular:      Rate and Rhythm: Normal rate and regular rhythm. Pulmonary:      Effort: Pulmonary effort is normal.      Breath sounds: Normal breath sounds. Musculoskeletal:      Right lower leg: No edema. Left lower leg: No edema. Comments: Right knee has significant arthritis. Not hot or red. No effusion appreciated. Skin:     General: Skin is warm and dry. Neurological:      Mental Status: He is alert and oriented to person, place, and time. Psychiatric:         Mood and Affect: Mood normal.         Behavior: Behavior normal.         ASSESSMENT and PLAN    ICD-10-CM ICD-9-CM    1. Gross hematuria  R31.0 599.71 URINALYSIS W/ RFLX MICROSCOPIC      CULTURE, URINE      CYTOLOGY NON-GYN      CULTURE, URINE      URINALYSIS W/ RFLX MICROSCOPIC      US ABD COMP      ciprofloxacin HCl (CIPRO) 250 mg tablet   2.  Right knee pain, unspecified chronicity  M25.561 719.46 triamcinolone acetonide (KENALOG-40) 40 mg/mL injection 40 mg      UT DRAIN/INJECT LARGE JOINT/BURSA     Available hospital/ER record reviewed    Urine has gross hematuria  Recheck culture since [facility]'s cx was mixed. Will change antibiotic just in case since keflex has not helped  Also request cytology  Will request ultrasound as well  May need PSA. Right knee pain--arthritic on exam. Recommend kenalog injection as he has been using topical meds, heat, and tylenol      Pt is overdue for his routine f/u and will re scheduled first available 30 minute time slot                [facility]  OFFICE PROCEDURE PROGRESS NOTE        Chart reviewed for the following:   Carri An MD, have reviewed the History, Physical and updated the Allergic reactions for [address] [place] [address] performed immediately prior to start of procedure:   Carri An MD, have performed the following reviews on Jenna Phelan prior to the start of the procedure:            * Patient was identified by name and date of birth   * Agreement on procedure being performed was verified  * Risks and Benefits explained to the patient  * Procedure site verified and marked as necessary  * Patient was positioned for comfort  * Consent was signed and verified     Time: 4:22 PM        Date of procedure: 5/4/2022    Procedure performed by: Keron Isbell MD    Provider assisted by: self        Patient assisted by: self    How tolerated by patient: tolerated the procedure well with no complications    Post Procedural Pain Scale: 0 - No Hurt    Comments: none                After discussion with the patient of risks and benefits, verbal permission was given to receive a kenalog injection into his right knee    Indications:   Unexplained monoarthritis    Procedure:  After consent was obtained, using sterile technique the right knee joint was prepped using alcohol. Local anesthetic used: 1% lidocaine. .    Kenalog 40 mg was mixed with 1% lidocaine 2 ml ml  and injected into the joint and the needle withdrawn. The procedure was well tolerated.   The patient is asked to continue to rest the joint for a few more days before resuming regular activities. It may be more painful for the first 1-2 days. Watch for fever, or increased swelling or persistent pain in the joint. Call or return to clinic prn if such symptoms occur or there is failure to improve as anticipated.

## 2022-05-11 NOTE — PROGRESS NOTES
Pt seen at ER with constipation and hematuria  CT scan performed:      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 Advanced emphysema. Cluster of nodules LEFT lower lobe anteriorly more likely infection than metastatic disease. Tiny renal cysts. Dictated ByMelissa Ruggiero on 5/8/2022 7:58 PM     As attending radiologist, I have assessed the study images, and dictated or reviewed/edited the final report as needed. Signed By: Mimi Juarez MD on 5/8/2022 8:38 PM    Narrative  Performed by RADIOLOGY  EXAM: CT ABD/PELVIS-IV ONLY     CLINICAL INDICATION/HISTORY: Hematuria x2 weeks, constipation x1 week, lower abdominal pain, history of lung cancer     COMPARISON: CT 12/15/2021     TECHNIQUE:  CT abdomen and pelvis with IV contrast.   All CT scans at this facility are performed using dose optimization technique as appropriate to the performed examination, to include automated exposure control, adjustment of the mA and/or kV according to patient's size (including appropriate matching for site-specific examinations), or use of an iterative reconstruction technique. FINDINGS:   Lower chest: Left hilar surgical clips. Stable 5 mm left anterolateral lung base pulmonary nodule. Cluster of small nodular densities in the left lower lobe appears new compared to prior exam. (Axial 9-20 Bilateral emphysematous changes. Liver: Negative. Biliary: Negative. Pancreas: Negative. Spleen: Negative. Adrenal glands: Negative.      Stomach, Small Bowel and Colon: Negative. Pelvic Organs: Negative     Bladder/ureters/kidneys: LEFT kidney shows tiny 2 mm stone lower pole. Lower pole cyst measuring 3 mm. LEFT ureter normal in course and caliber. No urothelial lesions. RIGHT kidney shows infiltrating mass at the hilum and extending through the entire course of the ureter. Delayed nephrogram.     There is a heterogeneous, irregular mass along the posterior bladder wall that measures approximately 2.5 x 5.3 x 4.5 cm. This mass appears to extend into the inferior portion of the right ureter. Trabeculated urinary bladder with small diverticulum anteriorly. Lymph nodes: Retrocaval lymph nodes measuring up to 13 mm (23). RIGHT external iliac lymph node measuring 27 mm (59). RIGHT perirenal lymph node measuring 2 cm (43). Vessels: Unremarkable for age. Peritoneal Spaces: No free fluid or free air. Body wall: Negative. Bones: No acute or aggressive osseous lesions identified. Median sternotomy.

## 2022-05-12 NOTE — TELEPHONE ENCOUNTER
Received a call from [facility] re: order clarification for US Abd. PCP notified and advised recently informed by [facility] pt was seen at the ED and appropriate imaging was performed at that time. This test is no longer required. Informed Dianne at [facility], staff verbalizes understanding. Call placed to the pt and left a message re: ultrasound is no longer required due to imaging perform at [facility] ED. Additionally, pt is encouraged to keep the appt scheduled w/ PCP today.

## 2022-05-12 NOTE — PROGRESS NOTES
1. \"Have you been to the ER, urgent care clinic since your last visit? Hospitalized since your last visit? \" Yes [facility] 05/2022 for Hematuria    2. \"Have you seen or consulted any other health care providers outside of the [address] [place] [address] since your last visit? \" Yes Urology  05/12/22    3. For patients aged 39-70: Has the patient had a colonoscopy / FIT/ Cologuard? Yes - no Care Gap present      If the patient is female:    4. For patients aged 41-77: Has the patient had a mammogram within the past 2 years? NA - based on age or sex      11. For patients aged 21-65: Has the patient had a pap smear?  NA - based on age or sex

## 2022-05-12 NOTE — PROGRESS NOTES
HISTORY OF PRESENT ILLNESS  Merry Munguia is a 78 y.o. male. BP (!) 92/55 (BP 1 Location: Left upper arm, BP Patient Position: Sitting, BP Cuff Size: Adult)   Pulse 62   Temp (!) 96.5 °F (35.8 °C) (Temporal)   Resp 18   Ht 5' 8\" (1.727 m)   Wt 155 lb (70.3 kg)   SpO2 94%   BMI 23.57 kg/m²     Here to f/u ER visit for hematuria    He had been seen here and urine did not find infection. Cytology was inconclusive. At the ER they did a CT which revealed a cancer    He saw urology this morning and will be referred to the cancer center     He is aware of the diagnosis and is naturally upset. He has lost 11# in a week    ED Follow-up  The history is provided by the patient. This is a new problem. Associated symptoms include shortness of breath (baseline). Cough  The history is provided by the patient (Eleazar Galea has not helped his cough. It has been windy, cool, and damp.). This is a chronic problem. The current episode started more than 1 week ago. The problem has been gradually worsening. Associated symptoms include shortness of breath (baseline). Review of Systems   Constitutional: Negative for chills and fever. Respiratory: Positive for cough, sputum production (clear) and shortness of breath (baseline). Genitourinary: Positive for hematuria. Negative for urgency. Physical Exam  Vitals and nursing note reviewed. Constitutional:       General: He is not in acute distress. Appearance: Normal appearance. He is well-developed. He is not diaphoretic. HENT:      Head: Normocephalic and atraumatic. Cardiovascular:      Rate and Rhythm: Normal rate and regular rhythm. Pulmonary:      Effort: Pulmonary effort is normal. No respiratory distress. Breath sounds: Rhonchi present. Skin:     General: Skin is warm and dry. Neurological:      Mental Status: He is alert and oriented to person, place, and time.    Psychiatric:         Mood and Affect: Mood normal.         Behavior: Behavior normal.         ASSESSMENT and PLAN    ICD-10-CM ICD-9-CM    1. Gross hematuria  R31.0 599.71    2. Cough  R05.9 786.2 guaiFENesin-codeine (ROBITUSSIN AC) 100-10 mg/5 mL solution     Hematuria. CT revealing cancer. Stop the aspirin. . Stay hydrated  No NSAIDS     F/u as recommended by urology    Cough--probably aggravated by weather and his underlying COPD  Add Robitussin AC for cough and congestion. Phlegm is clear so no need for an antibiotic at this time    F/u here as appointed next week for recheck.

## 2022-05-18 NOTE — PROGRESS NOTES
1. \"Have you been to the ER, urgent care clinic since your last visit? Hospitalized since your last visit? \" No    2. \"Have you seen or consulted any other health care providers outside of the [address] [place] [address] since your last visit? \" No     3. For patients aged 39-70: Has the patient had a colonoscopy / FIT/ Cologuard? Yes - no Care Gap present      If the patient is female:    4. For patients aged 41-77: Has the patient had a mammogram within the past 2 years? NA - based on age or sex      11. For patients aged 21-65: Has the patient had a pap smear?  NA - based on age or sex

## 2022-05-18 NOTE — PROGRESS NOTES
HISTORY OF PRESENT ILLNESS  Keshia Boston is a 78 y.o. male. BP (!) 87/62 (BP 1 Location: Right arm, BP Patient Position: Sitting, BP Cuff Size: Adult)   Pulse 100   Temp 98.4 °F (36.9 °C) (Temporal)   Resp 16   Ht 5' 8\" (1.727 m)   Wt 153 lb (69.4 kg)   SpO2 98% Comment: 2L/min  BMI 23.26 kg/m²         Also to discuss recent urology visit    Cough  The history is provided by the patient (her to f/u very wet bronchitic cough at last visit.). This is a chronic problem. The current episode started more than 1 week ago. The problem has been gradually improving. Leg Pain   The history is provided by the patient (slowly worsening RLE pain along the ankle calf, and knee). This is a recurrent problem. The current episode started more than 1 week ago. The problem occurs every several days. Review of Systems   Constitutional: Positive for malaise/fatigue and weight loss. Respiratory: Positive for cough. Musculoskeletal: Positive for joint pain (RLE pain). Physical Exam  Vitals and nursing note reviewed. Constitutional:       General: He is not in acute distress. Appearance: He is well-developed. He is ill-appearing. He is not diaphoretic. HENT:      Head: Normocephalic and atraumatic. Cardiovascular:      Rate and Rhythm: Normal rate and regular rhythm. Pulmonary:      Effort: Pulmonary effort is normal.      Breath sounds: Normal breath sounds. Musculoskeletal:      Right lower leg: Edema present. Comments: Some arthritic changes right knee w/o effusion or heat   Skin:     General: Skin is warm and dry. Neurological:      Mental Status: He is alert and oriented to person, place, and time. Psychiatric:         Mood and Affect: Mood normal.         Behavior: Behavior normal.         ASSESSMENT and PLAN    ICD-10-CM ICD-9-CM    1. Cough  R05.9 786.2    2.  Pain of lower extremity, unspecified laterality  M79.606 729.5 HYDROcodone-acetaminophen (NORCO) 5-325 mg per tablet 3. Cancer associated pain  G89.3 338.3 HYDROcodone-acetaminophen (NORCO) 5-325 mg per tablet   4. Anorexia  R63.0 783.0 dronabinoL (MARINOL) 5 mg capsule         Cough is back to baseline    Has right leg pain and some swelling. Sometimes it hurts to  the leg and he needs to help with his hands. Pain is usually in lower leg but can move up into his proximal thigh. Not clear if related to his cancer given the location in his bladder (and right renal pelvis)  Will RX norco    Anorexia. RX marinol BID    BP low. Stop losartan.  Consider stopping flomax    Has appt with urology later this month, Dr. Alicia Bermeo at  [facility]    F/u with 3-4 weeks

## 2022-05-31 PROBLEM — K08.109 EDENTULOUS: Status: ACTIVE | Noted: 2021-01-01

## 2022-05-31 PROBLEM — M19.90 CHRONIC OSTEOARTHRITIS: Status: ACTIVE | Noted: 2021-01-01

## 2022-05-31 PROBLEM — Q38.1 ANKYLOGLOSSIA: Status: ACTIVE | Noted: 2021-01-01

## 2022-05-31 PROBLEM — E44.0 MODERATE MALNUTRITION (HCC): Status: ACTIVE | Noted: 2022-01-01

## 2022-05-31 PROBLEM — R13.10 DYSPHAGIA: Status: ACTIVE | Noted: 2022-01-01

## 2022-05-31 PROBLEM — C34.12 MALIGNANT NEOPLASM OF UPPER LOBE OF LEFT LUNG (HCC): Status: ACTIVE | Noted: 2017-08-24

## 2022-05-31 PROBLEM — K08.20: Status: ACTIVE | Noted: 2021-01-01

## 2022-05-31 PROBLEM — R91.8 MULTIPLE LUNG NODULES ON CT: Status: ACTIVE | Noted: 2017-03-24

## 2022-05-31 PROBLEM — Q62.11 HYDRONEPHROSIS WITH URETEROPELVIC JUNCTION (UPJ) OBSTRUCTION: Status: ACTIVE | Noted: 2022-01-01

## 2022-05-31 PROBLEM — R34 OLIGURIA: Status: ACTIVE | Noted: 2022-01-01

## 2022-05-31 PROBLEM — R07.9 CHEST PAIN: Status: ACTIVE | Noted: 2019-09-25

## 2022-05-31 PROBLEM — E87.1 HYPONATREMIA: Status: ACTIVE | Noted: 2022-01-01

## 2022-05-31 PROBLEM — Z95.1 PRESENCE OF AORTOCORONARY BYPASS GRAFT: Status: ACTIVE | Noted: 2017-02-13

## 2022-05-31 PROBLEM — J96.11 CHRONIC RESPIRATORY FAILURE WITH HYPOXIA (HCC): Status: ACTIVE | Noted: 2020-04-22

## 2022-05-31 PROBLEM — R31.9 HEMATURIA: Status: ACTIVE | Noted: 2022-01-01

## 2022-05-31 PROBLEM — N17.9 AKI (ACUTE KIDNEY INJURY) (HCC): Status: ACTIVE | Noted: 2022-01-01

## 2022-05-31 PROBLEM — E87.5 HYPERKALEMIA: Status: ACTIVE | Noted: 2022-01-01

## 2022-05-31 PROBLEM — R53.81 DEBILITY: Status: ACTIVE | Noted: 2022-01-01

## 2022-05-31 PROBLEM — R60.0 LOWER EXTREMITY EDEMA: Status: ACTIVE | Noted: 2022-01-01

## 2022-10-28 DIAGNOSIS — Z76.0 MEDICATION REFILL: ICD-10-CM

## 2022-10-28 RX ORDER — FLUTICASONE PROPIONATE AND SALMETEROL 500; 50 UG/1; UG/1
POWDER RESPIRATORY (INHALATION)
Qty: 180 EACH | Refills: 8 | OUTPATIENT
Start: 2022-10-28

## 2024-07-12 NOTE — ACP (ADVANCE CARE PLANNING)
Advance Care Planning (ACP) Provider Conversation Snapshot    Date of ACP Conversation: 06/18/18  Persons included in Conversation:  patient  Length of ACP Conversation in minutes:  <16 minutes (Non-Billable)    Authorized Decision Maker (if patient is incapable of making informed decisions): This person is: Other Legally Authorized Decision Maker (e.g. Next of Kin)          For Patients with Decision Making Capacity:   Values/Goals: Exploration of values, goals, and preferences if recovery is not expected, even with continued medical treatment in the event of:  Imminent death  Severe, permanent brain injury  \"In these circumstances, what matters most to you? \"  Care focused more on comfort or quality of life. Conversation Outcomes / Follow-Up Plan:   discussed with pt.  Pt would like to discuss with family and friends Take over the counter pain medication